# Patient Record
Sex: FEMALE | Race: BLACK OR AFRICAN AMERICAN | Employment: FULL TIME | URBAN - METROPOLITAN AREA
[De-identification: names, ages, dates, MRNs, and addresses within clinical notes are randomized per-mention and may not be internally consistent; named-entity substitution may affect disease eponyms.]

---

## 2017-01-20 RX ORDER — ERGOCALCIFEROL 1.25 MG/1
CAPSULE ORAL
Qty: 6 CAPSULE | Refills: 0 | OUTPATIENT
Start: 2017-01-20

## 2017-01-20 NOTE — TELEPHONE ENCOUNTER
Reviewed Vitamin D refill request with pt. Pt wasn't sure if she was to take this med after she had her baby. Med was last prescribed 9/2/15.     Please advise

## 2017-01-24 ENCOUNTER — OFFICE VISIT (OUTPATIENT)
Dept: FAMILY MEDICINE CLINIC | Facility: CLINIC | Age: 27
End: 2017-01-24

## 2017-01-24 ENCOUNTER — APPOINTMENT (OUTPATIENT)
Dept: LAB | Age: 27
End: 2017-01-24
Attending: FAMILY MEDICINE
Payer: COMMERCIAL

## 2017-01-24 VITALS
SYSTOLIC BLOOD PRESSURE: 128 MMHG | BODY MASS INDEX: 40 KG/M2 | WEIGHT: 220 LBS | DIASTOLIC BLOOD PRESSURE: 84 MMHG | HEART RATE: 69 BPM

## 2017-01-24 DIAGNOSIS — E55.9 VITAMIN D DEFICIENCY: ICD-10-CM

## 2017-01-24 DIAGNOSIS — E55.9 VITAMIN D DEFICIENCY: Primary | ICD-10-CM

## 2017-01-24 PROCEDURE — 36415 COLL VENOUS BLD VENIPUNCTURE: CPT

## 2017-01-24 PROCEDURE — 99212 OFFICE O/P EST SF 10 MIN: CPT | Performed by: FAMILY MEDICINE

## 2017-01-24 PROCEDURE — 99213 OFFICE O/P EST LOW 20 MIN: CPT | Performed by: FAMILY MEDICINE

## 2017-01-24 PROCEDURE — 82306 VITAMIN D 25 HYDROXY: CPT

## 2017-01-24 RX ORDER — NORETHINDRONE ACETATE AND ETHINYL ESTRADIOL AND FERROUS FUMARATE 1MG-20(21)
KIT ORAL
COMMUNITY
Start: 2017-01-19 | End: 2018-02-12 | Stop reason: ALTCHOICE

## 2017-01-24 NOTE — PROGRESS NOTES
HPI:    Nils Pelayo is a 23year old female presents to clinic for follow up regarding vit D deficiency. About one year back she was told that this was low, started taking weekly supplements but stopped when she found out she was pregnant.  Would like thi d deficiency  (primary encounter diagnosis)  - Vit D to lab  - will follow up results and treat accordingly          Orders This Visit:    Orders Placed This Encounter  Vitamin D, 25-Hydroxy [E]    Meds This Visit:    No prescriptions requested or ordered

## 2017-01-25 LAB — 25(OH)D3 SERPL-MCNC: 14.9 NG/ML

## 2017-12-19 ENCOUNTER — LAB ENCOUNTER (OUTPATIENT)
Dept: LAB | Age: 27
End: 2017-12-19
Attending: FAMILY MEDICINE
Payer: COMMERCIAL

## 2017-12-19 ENCOUNTER — OFFICE VISIT (OUTPATIENT)
Dept: FAMILY MEDICINE CLINIC | Facility: CLINIC | Age: 27
End: 2017-12-19

## 2017-12-19 VITALS
TEMPERATURE: 98 F | HEIGHT: 62 IN | BODY MASS INDEX: 40.48 KG/M2 | HEART RATE: 79 BPM | RESPIRATION RATE: 17 BRPM | WEIGHT: 220 LBS | SYSTOLIC BLOOD PRESSURE: 115 MMHG | DIASTOLIC BLOOD PRESSURE: 80 MMHG

## 2017-12-19 DIAGNOSIS — Z00.00 ANNUAL PHYSICAL EXAM: ICD-10-CM

## 2017-12-19 DIAGNOSIS — Z00.00 ANNUAL PHYSICAL EXAM: Primary | ICD-10-CM

## 2017-12-19 PROCEDURE — 87591 N.GONORRHOEAE DNA AMP PROB: CPT

## 2017-12-19 PROCEDURE — 86780 TREPONEMA PALLIDUM: CPT

## 2017-12-19 PROCEDURE — 84443 ASSAY THYROID STIM HORMONE: CPT

## 2017-12-19 PROCEDURE — 87491 CHLMYD TRACH DNA AMP PROBE: CPT

## 2017-12-19 PROCEDURE — 36415 COLL VENOUS BLD VENIPUNCTURE: CPT

## 2017-12-19 PROCEDURE — 99395 PREV VISIT EST AGE 18-39: CPT | Performed by: FAMILY MEDICINE

## 2017-12-19 PROCEDURE — 80053 COMPREHEN METABOLIC PANEL: CPT

## 2017-12-19 PROCEDURE — 85025 COMPLETE CBC W/AUTO DIFF WBC: CPT

## 2017-12-19 PROCEDURE — 87389 HIV-1 AG W/HIV-1&-2 AB AG IA: CPT

## 2017-12-19 PROCEDURE — 80061 LIPID PANEL: CPT

## 2018-01-08 ENCOUNTER — TELEPHONE (OUTPATIENT)
Dept: FAMILY MEDICINE CLINIC | Facility: CLINIC | Age: 28
End: 2018-01-08

## 2018-01-08 NOTE — TELEPHONE ENCOUNTER
Reviewed results with pt as pt had not seen/noticed SK's note on MyChart. Pt voiced understanding and denies further questions/concerns.  Ok thanks        Written by Almita Lei MD on 12/21/2017  5:34 PM   Ruddy Michelle, all of your labs look normal, your ch

## 2018-02-12 ENCOUNTER — OFFICE VISIT (OUTPATIENT)
Dept: FAMILY MEDICINE CLINIC | Facility: CLINIC | Age: 28
End: 2018-02-12

## 2018-02-12 VITALS
DIASTOLIC BLOOD PRESSURE: 84 MMHG | HEART RATE: 85 BPM | HEIGHT: 62 IN | RESPIRATION RATE: 14 BRPM | WEIGHT: 220 LBS | SYSTOLIC BLOOD PRESSURE: 127 MMHG | BODY MASS INDEX: 40.48 KG/M2 | TEMPERATURE: 98 F

## 2018-02-12 DIAGNOSIS — Z30.09 GENERAL COUNSELING AND ADVICE FOR CONTRACEPTIVE MANAGEMENT: Primary | ICD-10-CM

## 2018-02-12 PROCEDURE — 99213 OFFICE O/P EST LOW 20 MIN: CPT | Performed by: FAMILY MEDICINE

## 2018-02-12 PROCEDURE — 99212 OFFICE O/P EST SF 10 MIN: CPT | Performed by: FAMILY MEDICINE

## 2018-02-12 RX ORDER — ETONOGESTREL AND ETHINYL ESTRADIOL 11.7; 2.7 MG/1; MG/1
INSERT, EXTENDED RELEASE VAGINAL
Qty: 1 EACH | Refills: 0 | Status: SHIPPED | OUTPATIENT
Start: 2018-02-12 | End: 2018-04-17

## 2018-02-12 NOTE — PROGRESS NOTES
HPI:    Senia Koenig is a 33year old female presents to clinic for follow up regarding birth control. Would like to have this switched. States that she can't remember to take her pill everyday. Says that she ends up taking in 4-5 times a week.  Is afraid management  (primary encounter diagnosis)  - discussed the NuvaRing, Depo Shot and IUD. She would like to try the [employer], rx to pharmacy. Will axel for refills if satisfied by this method, will return if not and we will consider an IUD.  Safe sexual pract

## 2018-02-15 ENCOUNTER — TELEPHONE (OUTPATIENT)
Dept: OTHER | Age: 28
End: 2018-02-15

## 2018-02-15 NOTE — TELEPHONE ENCOUNTER
Dr.Weiler for Bo[facility] Strong pt was given Etonogestrel-Ethinyl Estradiol 0.12-0.015 MG/24HR Vaginal Ring and is not sure when is she to start it. She will like to know if she is to start using it when she gets her cycle or after her cycle.  She started her per

## 2018-04-17 ENCOUNTER — OFFICE VISIT (OUTPATIENT)
Dept: FAMILY MEDICINE CLINIC | Facility: CLINIC | Age: 28
End: 2018-04-17

## 2018-04-17 VITALS
RESPIRATION RATE: 14 BRPM | WEIGHT: 225 LBS | TEMPERATURE: 98 F | BODY MASS INDEX: 41 KG/M2 | HEART RATE: 85 BPM | SYSTOLIC BLOOD PRESSURE: 129 MMHG | DIASTOLIC BLOOD PRESSURE: 81 MMHG

## 2018-04-17 DIAGNOSIS — Z30.9 ENCOUNTER FOR CONTRACEPTIVE MANAGEMENT, UNSPECIFIED TYPE: Primary | ICD-10-CM

## 2018-04-17 PROCEDURE — 99212 OFFICE O/P EST SF 10 MIN: CPT | Performed by: FAMILY MEDICINE

## 2018-04-17 PROCEDURE — 99213 OFFICE O/P EST LOW 20 MIN: CPT | Performed by: FAMILY MEDICINE

## 2018-04-17 NOTE — PROGRESS NOTES
HPI:    Leena Brice is a 33year old female presents to clinic for contraception management. Notes that Nuvaring gave her intense nausea, so she stopped using it. No new partners. Patient's last menstrual period was 04/14/2018. 3-4/28 day cycles.  She den pill multiple times and has not been able to remember to take it. HCG level drawn, will return tomorrow for Depo shot. Safe sexual practices advised     Patient verbalized understanding of information discussed. No barriers to learning observed.

## 2018-04-18 ENCOUNTER — NURSE ONLY (OUTPATIENT)
Dept: FAMILY MEDICINE CLINIC | Facility: CLINIC | Age: 28
End: 2018-04-18

## 2018-04-18 PROCEDURE — 96372 THER/PROPH/DIAG INJ SC/IM: CPT | Performed by: FAMILY MEDICINE

## 2018-04-18 RX ORDER — MEDROXYPROGESTERONE ACETATE 150 MG/ML
150 INJECTION, SUSPENSION INTRAMUSCULAR
Status: DISCONTINUED | OUTPATIENT
Start: 2018-04-18 | End: 2019-03-12

## 2018-04-18 RX ADMIN — MEDROXYPROGESTERONE ACETATE 150 MG: 150 INJECTION, SUSPENSION INTRAMUSCULAR at 10:49:00

## 2018-04-18 NOTE — PROGRESS NOTES
Patient is here for a Depo-Injection. Patient's pregnancy test was negative. Injection given and calendar provided with dates of the next injection due. Patient verbalized an understanding and made next Depo-Injection appointment.

## 2018-07-17 ENCOUNTER — NURSE ONLY (OUTPATIENT)
Dept: FAMILY MEDICINE CLINIC | Facility: CLINIC | Age: 28
End: 2018-07-17

## 2018-07-17 DIAGNOSIS — Z30.42 ENCOUNTER FOR DEPO-PROVERA CONTRACEPTION: Primary | ICD-10-CM

## 2018-07-17 PROCEDURE — 96372 THER/PROPH/DIAG INJ SC/IM: CPT | Performed by: FAMILY MEDICINE

## 2018-07-17 RX ADMIN — MEDROXYPROGESTERONE ACETATE 150 MG: 150 INJECTION, SUSPENSION INTRAMUSCULAR at 10:27:00

## 2018-07-17 NOTE — PROGRESS NOTES
Pt is here for her Depo shot, within the window. Tolerated injection well. Pt was advised to schedule her next Depo between October 2 - 16.

## 2018-09-05 ENCOUNTER — TELEPHONE (OUTPATIENT)
Dept: OTHER | Age: 28
End: 2018-09-05

## 2018-09-05 NOTE — TELEPHONE ENCOUNTER
Action Requested: Summary for Provider     []  Critical Lab, Recommendations Needed  [] Need Additional Advice  []   FYI    []   Need Orders  [] Need Medications Sent to Pharmacy  []  Other     SUMMARY: Pt seeking recommendations for vaginal spotting.     P

## 2018-09-05 NOTE — TELEPHONE ENCOUNTER
Spoke with patient and relayed SK message below--patient verbalizes understanding and agreement. No further questions/concerns at this time.

## 2018-09-10 NOTE — TELEPHONE ENCOUNTER
Patient states she is still having symptoms discussed on 9/5/18. States the symptoms went away and have come back. She states this morning sharp abdominal cramping pain today same as previously but also now with nausea, denies vomiting.  Patient with blood

## 2018-09-12 ENCOUNTER — OFFICE VISIT (OUTPATIENT)
Dept: FAMILY MEDICINE CLINIC | Facility: CLINIC | Age: 28
End: 2018-09-12
Payer: COMMERCIAL

## 2018-09-12 VITALS
DIASTOLIC BLOOD PRESSURE: 80 MMHG | WEIGHT: 235 LBS | HEIGHT: 62 IN | BODY MASS INDEX: 43.24 KG/M2 | TEMPERATURE: 98 F | HEART RATE: 80 BPM | SYSTOLIC BLOOD PRESSURE: 123 MMHG

## 2018-09-12 DIAGNOSIS — N92.0 MENSTRUAL SPOTTING: Primary | ICD-10-CM

## 2018-09-12 LAB
CONTROL LINE PRESENT WITH A CLEAR BACKGROUND (YES/NO): YES YES/NO
KIT LOT #: NORMAL NUMERIC
PREGNANCY TEST, URINE: NEGATIVE

## 2018-09-12 PROCEDURE — 99212 OFFICE O/P EST SF 10 MIN: CPT | Performed by: FAMILY MEDICINE

## 2018-09-12 PROCEDURE — 99213 OFFICE O/P EST LOW 20 MIN: CPT | Performed by: FAMILY MEDICINE

## 2018-09-12 PROCEDURE — 81025 URINE PREGNANCY TEST: CPT | Performed by: FAMILY MEDICINE

## 2018-09-13 NOTE — PROGRESS NOTES
HPI:    Jazz Hernandez is a 33year old female presents to clinic for follow up. Notes that after getting depo shot, she did not have a cycle for about 4 months. Last week, she developed some cramping with dark spotting for 4-5 days.  Notes that symptoms hav these symptoms are normal side effects with the depo shot. Says she is fine not getting her cycles regularly if that is a normal side effect. Will return in November for next shot or sooner PRN    Patient verbalized understanding of information discussed.

## 2018-10-16 ENCOUNTER — NURSE ONLY (OUTPATIENT)
Dept: FAMILY MEDICINE CLINIC | Facility: CLINIC | Age: 28
End: 2018-10-16
Payer: COMMERCIAL

## 2018-10-16 DIAGNOSIS — Z30.42 ENCOUNTER FOR DEPO-PROVERA CONTRACEPTION: Primary | ICD-10-CM

## 2018-10-16 PROCEDURE — 96372 THER/PROPH/DIAG INJ SC/IM: CPT | Performed by: FAMILY MEDICINE

## 2018-10-16 RX ADMIN — MEDROXYPROGESTERONE ACETATE 150 MG: 150 INJECTION, SUSPENSION INTRAMUSCULAR at 10:41:00

## 2018-10-16 NOTE — PROCEDURES
Pt is here for Depo injection, within the window. Tolerated well, and next injection due between Jan 1 - 15. Calendar given.

## 2019-01-19 ENCOUNTER — TELEPHONE (OUTPATIENT)
Dept: FAMILY MEDICINE CLINIC | Facility: CLINIC | Age: 29
End: 2019-01-19

## 2019-01-19 DIAGNOSIS — Z30.9 ENCOUNTER FOR CONTRACEPTIVE MANAGEMENT, UNSPECIFIED TYPE: Primary | ICD-10-CM

## 2019-01-22 ENCOUNTER — APPOINTMENT (OUTPATIENT)
Dept: LAB | Age: 29
End: 2019-01-22
Attending: FAMILY MEDICINE
Payer: COMMERCIAL

## 2019-01-22 DIAGNOSIS — Z30.9 ENCOUNTER FOR CONTRACEPTIVE MANAGEMENT, UNSPECIFIED TYPE: ICD-10-CM

## 2019-01-22 LAB — HCG SERPL QL: NEGATIVE

## 2019-01-22 PROCEDURE — 36415 COLL VENOUS BLD VENIPUNCTURE: CPT

## 2019-01-22 PROCEDURE — 84703 CHORIONIC GONADOTROPIN ASSAY: CPT

## 2019-01-22 NOTE — TELEPHONE ENCOUNTER
Called pt and informed her she needs to come in for labs due to being late on depo injection. Pt v/u and states she can come in today for labs. Will route to provider to have labs enter.

## 2019-01-26 ENCOUNTER — PATIENT MESSAGE (OUTPATIENT)
Dept: FAMILY MEDICINE CLINIC | Facility: CLINIC | Age: 29
End: 2019-01-26

## 2019-01-26 NOTE — TELEPHONE ENCOUNTER
From: Shaniqua Kapadia  To: Neo Martinez MD  Sent: 1/26/2019 10:13 AM CST  Subject: Test Results Question    I have a question about HCG, BETA SUBUNIT, QUAL resulted on 1/22/19, 9:38 PM.    Camilo Cleone so do I need to take another blood test or something or everythi

## 2019-01-26 NOTE — PROGRESS NOTES
Dr. Nyla Trejo, Pt's pregnancy tests were negative. [id number] [address]al for Pt to schedule nurse visit for Depo injection? Please advise.        Tha Ibarra, CMA       11:19 AM   Note      Called pt and informed her she needs to come in for labs due to being late on dep

## 2019-01-28 ENCOUNTER — TELEPHONE (OUTPATIENT)
Dept: FAMILY MEDICINE CLINIC | Facility: CLINIC | Age: 29
End: 2019-01-28

## 2019-01-28 NOTE — PROGRESS NOTES
Can we please educate patient on the Depo policy. Needs to have injection within 24 hours of result. If she didn't come back after last time, everything needs to be repeated. Thanks.

## 2019-01-28 NOTE — TELEPHONE ENCOUNTER
Patient called regarding earlier conversation with RN. Patient informed us that she has had sexual relations since 1/22/19. Patient also wanted to discuss other birth control options and wanted to discuss options that don't cause as much weight gain.  Appoi

## 2019-01-28 NOTE — TELEPHONE ENCOUNTER
Siobhan Singh MD routed conversation to [facility] Rn Triage 1 hour ago (11:01 AM)      Siobhan Singh MD 1 hour ago (11:01 AM)        Can we please educate patient on the Depo policy. Needs to have injection within 24 hours of result.  If she didn't come ba

## 2019-01-28 NOTE — TELEPHONE ENCOUNTER
Pt returning RN call re Depo injection. Stated was not informed of negative HCG lab until after 24 hours. Informed pt of need to have test again due to the length of time. Pt stated she'd call back and make apt with SK for 1/29.

## 2019-01-29 ENCOUNTER — OFFICE VISIT (OUTPATIENT)
Dept: FAMILY MEDICINE CLINIC | Facility: CLINIC | Age: 29
End: 2019-01-29
Payer: COMMERCIAL

## 2019-01-29 VITALS
HEIGHT: 62 IN | WEIGHT: 233.63 LBS | TEMPERATURE: 97 F | HEART RATE: 79 BPM | DIASTOLIC BLOOD PRESSURE: 83 MMHG | BODY MASS INDEX: 42.99 KG/M2 | SYSTOLIC BLOOD PRESSURE: 125 MMHG

## 2019-01-29 DIAGNOSIS — Z30.40 ENCOUNTER FOR SURVEILLANCE OF CONTRACEPTIVES, UNSPECIFIED CONTRACEPTIVE: Primary | ICD-10-CM

## 2019-01-29 PROCEDURE — 99213 OFFICE O/P EST LOW 20 MIN: CPT | Performed by: FAMILY MEDICINE

## 2019-01-29 PROCEDURE — 99212 OFFICE O/P EST SF 10 MIN: CPT | Performed by: FAMILY MEDICINE

## 2019-01-29 RX ORDER — NORETHINDRONE ACETATE AND ETHINYL ESTRADIOL 1.5-30(21)
1 KIT ORAL DAILY
Qty: 3 PACKAGE | Refills: 1 | Status: SHIPPED | OUTPATIENT
Start: 2019-01-29 | End: 2019-07-07

## 2019-01-31 NOTE — PROGRESS NOTES
HPI:    Angelica Parikh is a 33year old female presents to clinic to discuss birth control options. Notes that she was previously on the Depakote shot, but feels she gained a lot of weight with this.   Is sexually active with one partner, using condoms abou surveillance of contraceptives, unspecified contraceptive  (primary encounter diagnosis)  Plan:   -Contraceptive methods discussed with patient, would like to try the pill again. Rx to pharmacy.   I did advise her to start exercising and to have a balanced

## 2019-03-12 ENCOUNTER — OFFICE VISIT (OUTPATIENT)
Dept: FAMILY MEDICINE CLINIC | Facility: CLINIC | Age: 29
End: 2019-03-12
Payer: COMMERCIAL

## 2019-03-12 ENCOUNTER — LAB ENCOUNTER (OUTPATIENT)
Dept: LAB | Age: 29
End: 2019-03-12
Attending: FAMILY MEDICINE
Payer: COMMERCIAL

## 2019-03-12 VITALS
SYSTOLIC BLOOD PRESSURE: 110 MMHG | BODY MASS INDEX: 42.76 KG/M2 | HEART RATE: 90 BPM | HEIGHT: 62 IN | TEMPERATURE: 98 F | DIASTOLIC BLOOD PRESSURE: 77 MMHG | WEIGHT: 232.38 LBS

## 2019-03-12 DIAGNOSIS — Z01.419 WELL WOMAN EXAM WITH ROUTINE GYNECOLOGICAL EXAM: Primary | ICD-10-CM

## 2019-03-12 DIAGNOSIS — Z01.419 WELL WOMAN EXAM WITH ROUTINE GYNECOLOGICAL EXAM: ICD-10-CM

## 2019-03-12 LAB
ALBUMIN SERPL-MCNC: 3.4 G/DL (ref 3.4–5)
ALBUMIN/GLOB SERPL: 0.9 {RATIO} (ref 1–2)
ALP LIVER SERPL-CCNC: 34 U/L (ref 37–98)
ALT SERPL-CCNC: 16 U/L (ref 13–56)
ANION GAP SERPL CALC-SCNC: 6 MMOL/L (ref 0–18)
AST SERPL-CCNC: 16 U/L (ref 15–37)
BASOPHILS # BLD AUTO: 0.01 X10(3) UL (ref 0–0.2)
BASOPHILS NFR BLD AUTO: 0.2 %
BILIRUB SERPL-MCNC: 0.4 MG/DL (ref 0.1–2)
BUN BLD-MCNC: 9 MG/DL (ref 7–18)
BUN/CREAT SERPL: 12.3 (ref 10–20)
CALCIUM BLD-MCNC: 8.1 MG/DL (ref 8.5–10.1)
CHLORIDE SERPL-SCNC: 109 MMOL/L (ref 98–107)
CHOLEST SMN-MCNC: 98 MG/DL (ref ?–200)
CO2 SERPL-SCNC: 27 MMOL/L (ref 21–32)
CREAT BLD-MCNC: 0.73 MG/DL (ref 0.55–1.02)
DEPRECATED RDW RBC AUTO: 39.9 FL (ref 35.1–46.3)
EOSINOPHIL # BLD AUTO: 0.02 X10(3) UL (ref 0–0.7)
EOSINOPHIL NFR BLD AUTO: 0.3 %
ERYTHROCYTE [DISTWIDTH] IN BLOOD BY AUTOMATED COUNT: 12.3 % (ref 11–15)
GLOBULIN PLAS-MCNC: 3.7 G/DL (ref 2.8–4.4)
GLUCOSE BLD-MCNC: 74 MG/DL (ref 70–99)
HCT VFR BLD AUTO: 37 % (ref 35–48)
HDLC SERPL-MCNC: 40 MG/DL (ref 40–59)
HGB BLD-MCNC: 11.9 G/DL (ref 12–16)
IMM GRANULOCYTES # BLD AUTO: 0.01 X10(3) UL (ref 0–1)
IMM GRANULOCYTES NFR BLD: 0.2 %
LDLC SERPL CALC-MCNC: 44 MG/DL (ref ?–100)
LYMPHOCYTES # BLD AUTO: 2.12 X10(3) UL (ref 1–4)
LYMPHOCYTES NFR BLD AUTO: 36.4 %
M PROTEIN MFR SERPL ELPH: 7.1 G/DL (ref 6.4–8.2)
MCH RBC QN AUTO: 28.8 PG (ref 26–34)
MCHC RBC AUTO-ENTMCNC: 32.2 G/DL (ref 31–37)
MCV RBC AUTO: 89.6 FL (ref 80–100)
MONOCYTES # BLD AUTO: 0.26 X10(3) UL (ref 0.1–1)
MONOCYTES NFR BLD AUTO: 4.5 %
NEUTROPHILS # BLD AUTO: 3.41 X10 (3) UL (ref 1.5–7.7)
NEUTROPHILS # BLD AUTO: 3.41 X10(3) UL (ref 1.5–7.7)
NEUTROPHILS NFR BLD AUTO: 58.4 %
NONHDLC SERPL-MCNC: 58 MG/DL (ref ?–130)
OSMOLALITY SERPL CALC.SUM OF ELEC: 291 MOSM/KG (ref 275–295)
PLATELET # BLD AUTO: 326 10(3)UL (ref 150–450)
POTASSIUM SERPL-SCNC: 3.4 MMOL/L (ref 3.5–5.1)
RBC # BLD AUTO: 4.13 X10(6)UL (ref 3.8–5.3)
SODIUM SERPL-SCNC: 142 MMOL/L (ref 136–145)
TRIGL SERPL-MCNC: 71 MG/DL (ref 30–149)
TSI SER-ACNC: 0.77 MIU/ML (ref 0.36–3.74)
VLDLC SERPL CALC-MCNC: 14 MG/DL (ref 0–30)
WBC # BLD AUTO: 5.8 X10(3) UL (ref 4–11)

## 2019-03-12 PROCEDURE — 86780 TREPONEMA PALLIDUM: CPT

## 2019-03-12 PROCEDURE — 84443 ASSAY THYROID STIM HORMONE: CPT

## 2019-03-12 PROCEDURE — 85025 COMPLETE CBC W/AUTO DIFF WBC: CPT

## 2019-03-12 PROCEDURE — 80053 COMPREHEN METABOLIC PANEL: CPT

## 2019-03-12 PROCEDURE — 36415 COLL VENOUS BLD VENIPUNCTURE: CPT

## 2019-03-12 PROCEDURE — 99395 PREV VISIT EST AGE 18-39: CPT | Performed by: FAMILY MEDICINE

## 2019-03-12 PROCEDURE — 87389 HIV-1 AG W/HIV-1&-2 AB AG IA: CPT

## 2019-03-12 PROCEDURE — 80061 LIPID PANEL: CPT

## 2019-03-12 NOTE — PROGRESS NOTES
HPI:    Ana Gomez is a 33year old female presents to clinic for a well woman exam.  No acute concerns or major changes. Normal appetite, balanced diet. Normal bowel movements and urination. Normal sleep habits.   Sexually active with one partner, no normal.   Mouth/Throat: Uvula is midline, oropharynx is clear and moist and mucous membranes are normal.   Eyes: Conjunctivae and EOM are normal. Pupils are equal, round, and reactive to light. Neck: Normal range of motion. Neck supple.  No thyromegaly pr This Visit:  Orders Placed This Encounter      CBC W Differential W Platelet [E]      Comp Metabolic Panel (14) [E]      TSH W Reflex To Free T4 [E]      Lipid Panel [E]      HIV AG AB Combo [E]      T Pallidum Screening [place] TREP [E]      ThinPrep PAP

## 2019-03-13 LAB
C TRACH DNA SPEC QL NAA+PROBE: NEGATIVE
N GONORRHOEA DNA SPEC QL NAA+PROBE: NEGATIVE
T PALLIDUM AB SER QL: NEGATIVE

## 2019-03-14 LAB
HPV I/H RISK 1 DNA SPEC QL NAA+PROBE: NEGATIVE
LAST PAP RESULT: NORMAL

## 2019-07-08 RX ORDER — NORETHINDRONE ACETATE AND ETHINYL ESTRADIOL 1.5-30(21)
KIT ORAL
Qty: 84 TABLET | Refills: 1 | Status: SHIPPED | OUTPATIENT
Start: 2019-07-08 | End: 2019-09-27

## 2019-07-08 NOTE — TELEPHONE ENCOUNTER
Gynecology Medications  Protocol Criteria:  · Appointment scheduled in the past 12 months or the next 3 months  · Pap smear in the past 12 months  · Pap smear WNL manually verified  Recent Outpatient Visits            3 months ago Well woman exam with rout

## 2019-08-08 ENCOUNTER — APPOINTMENT (OUTPATIENT)
Dept: LAB | Age: 29
End: 2019-08-08
Attending: FAMILY MEDICINE
Payer: COMMERCIAL

## 2019-08-08 ENCOUNTER — NURSE TRIAGE (OUTPATIENT)
Dept: OTHER | Age: 29
End: 2019-08-08

## 2019-08-08 ENCOUNTER — OFFICE VISIT (OUTPATIENT)
Dept: FAMILY MEDICINE CLINIC | Facility: CLINIC | Age: 29
End: 2019-08-08
Payer: COMMERCIAL

## 2019-08-08 VITALS
TEMPERATURE: 97 F | BODY MASS INDEX: 42.88 KG/M2 | WEIGHT: 233 LBS | DIASTOLIC BLOOD PRESSURE: 86 MMHG | HEIGHT: 62 IN | SYSTOLIC BLOOD PRESSURE: 128 MMHG | HEART RATE: 82 BPM | RESPIRATION RATE: 20 BRPM

## 2019-08-08 DIAGNOSIS — R42 DIZZINESS: ICD-10-CM

## 2019-08-08 DIAGNOSIS — R51.9 HEADACHE DISORDER: Primary | ICD-10-CM

## 2019-08-08 LAB
ALBUMIN SERPL-MCNC: 3.4 G/DL (ref 3.4–5)
ALBUMIN/GLOB SERPL: 0.9 {RATIO} (ref 1–2)
ALP LIVER SERPL-CCNC: 42 U/L (ref 37–98)
ALT SERPL-CCNC: 15 U/L (ref 13–56)
ANION GAP SERPL CALC-SCNC: 8 MMOL/L (ref 0–18)
AST SERPL-CCNC: 13 U/L (ref 15–37)
BILIRUB SERPL-MCNC: 0.4 MG/DL (ref 0.1–2)
BUN BLD-MCNC: 10 MG/DL (ref 7–18)
BUN/CREAT SERPL: 11.8 (ref 10–20)
CALCIUM BLD-MCNC: 8.6 MG/DL (ref 8.5–10.1)
CHLORIDE SERPL-SCNC: 106 MMOL/L (ref 98–112)
CO2 SERPL-SCNC: 27 MMOL/L (ref 21–32)
CONTROL LINE PRESENT WITH A CLEAR BACKGROUND (YES/NO): YES YES/NO
CREAT BLD-MCNC: 0.85 MG/DL (ref 0.55–1.02)
DEPRECATED RDW RBC AUTO: 38.2 FL (ref 35.1–46.3)
ERYTHROCYTE [DISTWIDTH] IN BLOOD BY AUTOMATED COUNT: 11.9 % (ref 11–15)
EST. AVERAGE GLUCOSE BLD GHB EST-MCNC: 100 MG/DL (ref 68–126)
GLOBULIN PLAS-MCNC: 3.9 G/DL (ref 2.8–4.4)
GLUCOSE BLD-MCNC: 84 MG/DL (ref 70–99)
HBA1C MFR BLD HPLC: 5.1 % (ref ?–5.7)
HCT VFR BLD AUTO: 38.4 % (ref 35–48)
HGB BLD-MCNC: 12.7 G/DL (ref 12–16)
KIT LOT #: NORMAL NUMERIC
M PROTEIN MFR SERPL ELPH: 7.3 G/DL (ref 6.4–8.2)
MCH RBC QN AUTO: 28.9 PG (ref 26–34)
MCHC RBC AUTO-ENTMCNC: 33.1 G/DL (ref 31–37)
MCV RBC AUTO: 87.5 FL (ref 80–100)
OSMOLALITY SERPL CALC.SUM OF ELEC: 290 MOSM/KG (ref 275–295)
PATIENT FASTING: YES
PLATELET # BLD AUTO: 328 10(3)UL (ref 150–450)
POTASSIUM SERPL-SCNC: 3.4 MMOL/L (ref 3.5–5.1)
PREGNANCY TEST, URINE: NEGATIVE
RBC # BLD AUTO: 4.39 X10(6)UL (ref 3.8–5.3)
SODIUM SERPL-SCNC: 141 MMOL/L (ref 136–145)
TSI SER-ACNC: 0.86 MIU/ML (ref 0.36–3.74)
WBC # BLD AUTO: 5.5 X10(3) UL (ref 4–11)

## 2019-08-08 PROCEDURE — 80053 COMPREHEN METABOLIC PANEL: CPT

## 2019-08-08 PROCEDURE — 85027 COMPLETE CBC AUTOMATED: CPT

## 2019-08-08 PROCEDURE — 81025 URINE PREGNANCY TEST: CPT | Performed by: FAMILY MEDICINE

## 2019-08-08 PROCEDURE — 99214 OFFICE O/P EST MOD 30 MIN: CPT | Performed by: FAMILY MEDICINE

## 2019-08-08 PROCEDURE — 84443 ASSAY THYROID STIM HORMONE: CPT

## 2019-08-08 PROCEDURE — 83036 HEMOGLOBIN GLYCOSYLATED A1C: CPT

## 2019-08-08 PROCEDURE — 36415 COLL VENOUS BLD VENIPUNCTURE: CPT

## 2019-08-08 NOTE — TELEPHONE ENCOUNTER
Action Requested: Summary for Provider     []  Critical Lab, Recommendations Needed  [] Need Additional Advice  []   FYI    []   Need Orders  [] Need Medications Sent to Pharmacy  []  Other     SUMMARY: pt states she has been having episodes of dizziness,

## 2019-08-08 NOTE — PROGRESS NOTES
HPI: Radha Andrade is a 30year old female who presents for bad headaches and dizziness. Started 3 days ago while she was taking shower. Felt like room was spinning. She came out and ate. Went to lay down but room was spinning again. [place] asleep for the night. New symptoms. Pt has not had headaches in the past.  Normal exam.  Will get baseline labs as well as MRI/MRA of the head and neck. Will call with results. Advised to proceed to ER if headache worsens or new symptoms develop. Pt verbalized understanding.

## 2019-08-15 ENCOUNTER — HOSPITAL ENCOUNTER (OUTPATIENT)
Dept: MRI IMAGING | Age: 29
Discharge: HOME | End: 2019-08-15
Attending: FAMILY MEDICINE
Payer: COMMERCIAL

## 2019-08-15 DIAGNOSIS — R42 DIZZINESS: ICD-10-CM

## 2019-08-15 DIAGNOSIS — R51.9 HEADACHE DISORDER: ICD-10-CM

## 2019-08-15 PROCEDURE — A9575 INJ GADOTERATE MEGLUMI 0.1ML: HCPCS | Performed by: FAMILY MEDICINE

## 2019-08-15 PROCEDURE — 70549 MR ANGIOGRAPH NECK W/O&W/DYE: CPT | Performed by: FAMILY MEDICINE

## 2019-08-15 PROCEDURE — 70544 MR ANGIOGRAPHY HEAD W/O DYE: CPT | Performed by: FAMILY MEDICINE

## 2019-08-15 PROCEDURE — 70553 MRI BRAIN STEM W/O & W/DYE: CPT | Performed by: FAMILY MEDICINE

## 2019-08-28 ENCOUNTER — OFFICE VISIT (OUTPATIENT)
Dept: FAMILY MEDICINE CLINIC | Facility: CLINIC | Age: 29
End: 2019-08-28
Payer: COMMERCIAL

## 2019-08-28 VITALS
SYSTOLIC BLOOD PRESSURE: 120 MMHG | WEIGHT: 231 LBS | BODY MASS INDEX: 42.51 KG/M2 | HEART RATE: 81 BPM | TEMPERATURE: 98 F | DIASTOLIC BLOOD PRESSURE: 82 MMHG | HEIGHT: 62 IN

## 2019-08-28 DIAGNOSIS — R42 DIZZINESS: ICD-10-CM

## 2019-08-28 DIAGNOSIS — R55 SYNCOPE, UNSPECIFIED SYNCOPE TYPE: Primary | ICD-10-CM

## 2019-08-28 DIAGNOSIS — R51.9 GENERALIZED HEADACHES: ICD-10-CM

## 2019-08-28 PROCEDURE — 99214 OFFICE O/P EST MOD 30 MIN: CPT | Performed by: FAMILY MEDICINE

## 2019-08-28 PROCEDURE — 93000 ELECTROCARDIOGRAM COMPLETE: CPT | Performed by: FAMILY MEDICINE

## 2019-08-28 NOTE — PROGRESS NOTES
HPI:    Maria Luz Carney is a 30year old female presents to clinic for follow-up. Patient was seen on 8/9 with severe headaches.   Had labs and an MRI of head/MRI of neck-all normal.  Reports that she got headaches were improving when on Monday she was at Pulmonary/Chest: Effort normal and breath sounds normal. No respiratory distress. She has no wheezes. She has no rales. Neurological: She is alert. Psychiatric: She has a normal mood and affect. Vitals reviewed.       ASSESSMENT/PLAN:   (R55) Syncop

## 2019-08-30 ENCOUNTER — TELEPHONE (OUTPATIENT)
Dept: FAMILY MEDICINE CLINIC | Facility: CLINIC | Age: 29
End: 2019-08-30

## 2019-08-30 NOTE — TELEPHONE ENCOUNTER
Pt dropped off FMLA forms, signed HIPAA+FCR, pt to be billed $25 processing fee.  Forms logged for OPO

## 2019-09-04 NOTE — TELEPHONE ENCOUNTER
FMLA forms faxed to [facility] @ [phone], confirm rcvd. Copy mailed to pt.

## 2019-09-26 ENCOUNTER — TELEPHONE (OUTPATIENT)
Dept: FAMILY MEDICINE CLINIC | Facility: CLINIC | Age: 29
End: 2019-09-26

## 2019-09-26 NOTE — TELEPHONE ENCOUNTER
Patient missed doses and had to start new pack so requesting early fill for     BLISOVI FE 1.5/30 1.5-30 MG-MCG Oral Tab TAKE 1 TABLET BY MOUTH DAILY Disp: 84 tablet Rfl: 1     Advised in the future to request from pharmacy first.

## 2019-09-27 RX ORDER — NORETHINDRONE ACETATE AND ETHINYL ESTRADIOL 1.5-30(21)
1 KIT ORAL
Qty: 84 TABLET | Refills: 1 | Status: SHIPPED | OUTPATIENT
Start: 2019-09-27 | End: 2020-06-15

## 2020-03-26 ENCOUNTER — NURSE TRIAGE (OUTPATIENT)
Dept: FAMILY MEDICINE CLINIC | Facility: CLINIC | Age: 30
End: 2020-03-26

## 2020-03-26 NOTE — TELEPHONE ENCOUNTER
Action Requested: Summary for Provider     []  Critical Lab, Recommendations Needed  [] Need Additional Advice  []   FYI    []   Need Orders  [] Need Medications Sent to Pharmacy  []  Other     SUMMARY: Patient calling stating that she has been experiencin

## 2020-04-02 ENCOUNTER — TELEPHONE (OUTPATIENT)
Dept: OTHER | Age: 30
End: 2020-04-02

## 2020-04-02 NOTE — TELEPHONE ENCOUNTER
Patient calling with condition update  Was informed a close contact at work tested positive for Covid 19. Patient does have a cough, no fever at this time, however had fever last week. No shortness of breath. Patient asking if she may be tested?   Has

## 2020-04-03 ENCOUNTER — VIRTUAL PHONE E/M (OUTPATIENT)
Dept: FAMILY MEDICINE CLINIC | Facility: CLINIC | Age: 30
End: 2020-04-03

## 2020-04-03 DIAGNOSIS — Z20.822 EXPOSURE TO 2019 NOVEL CORONAVIRUS: ICD-10-CM

## 2020-04-03 DIAGNOSIS — R05.9 COUGH: Primary | ICD-10-CM

## 2020-04-03 PROCEDURE — 99213 OFFICE O/P EST LOW 20 MIN: CPT | Performed by: FAMILY MEDICINE

## 2020-04-03 NOTE — PROGRESS NOTES
Virtual/Telephone Check-In    [facility] verbally consents to a Virtual/Telephone Check-In service on 04/03/20. Patient understands and accepts financial responsibility for any deductible, co-insurance and/or co-pays associated with this service.     Du

## 2020-06-15 RX ORDER — NORETHINDRONE ACETATE AND ETHINYL ESTRADIOL AND FERROUS FUMARATE 1.5-30(21)
KIT ORAL
Qty: 84 TABLET | Refills: 1 | Status: SHIPPED | OUTPATIENT
Start: 2020-06-15 | End: 2020-11-20

## 2020-08-12 ENCOUNTER — OFFICE VISIT (OUTPATIENT)
Dept: FAMILY MEDICINE CLINIC | Facility: CLINIC | Age: 30
End: 2020-08-12
Payer: COMMERCIAL

## 2020-08-12 ENCOUNTER — LAB ENCOUNTER (OUTPATIENT)
Dept: LAB | Age: 30
End: 2020-08-12
Attending: FAMILY MEDICINE
Payer: COMMERCIAL

## 2020-08-12 VITALS
SYSTOLIC BLOOD PRESSURE: 127 MMHG | WEIGHT: 233.63 LBS | HEIGHT: 61.42 IN | BODY MASS INDEX: 43.54 KG/M2 | HEART RATE: 73 BPM | TEMPERATURE: 97 F | DIASTOLIC BLOOD PRESSURE: 83 MMHG

## 2020-08-12 DIAGNOSIS — Z00.00 ANNUAL PHYSICAL EXAM: Primary | ICD-10-CM

## 2020-08-12 DIAGNOSIS — Z00.00 ANNUAL PHYSICAL EXAM: ICD-10-CM

## 2020-08-12 LAB
ALBUMIN SERPL-MCNC: 3.1 G/DL (ref 3.4–5)
ALBUMIN/GLOB SERPL: 0.8 {RATIO} (ref 1–2)
ALP LIVER SERPL-CCNC: 34 U/L (ref 37–98)
ALT SERPL-CCNC: 17 U/L (ref 13–56)
ANION GAP SERPL CALC-SCNC: 8 MMOL/L (ref 0–18)
AST SERPL-CCNC: 12 U/L (ref 15–37)
BASOPHILS # BLD AUTO: 0.02 X10(3) UL (ref 0–0.2)
BASOPHILS NFR BLD AUTO: 0.3 %
BILIRUB SERPL-MCNC: 0.3 MG/DL (ref 0.1–2)
BUN BLD-MCNC: 15 MG/DL (ref 7–18)
BUN/CREAT SERPL: 18.5 (ref 10–20)
CALCIUM BLD-MCNC: 8.3 MG/DL (ref 8.5–10.1)
CHLORIDE SERPL-SCNC: 108 MMOL/L (ref 98–112)
CHOLEST SMN-MCNC: 99 MG/DL (ref ?–200)
CO2 SERPL-SCNC: 24 MMOL/L (ref 21–32)
CREAT BLD-MCNC: 0.81 MG/DL (ref 0.55–1.02)
DEPRECATED RDW RBC AUTO: 38.7 FL (ref 35.1–46.3)
EOSINOPHIL # BLD AUTO: 0.04 X10(3) UL (ref 0–0.7)
EOSINOPHIL NFR BLD AUTO: 0.5 %
ERYTHROCYTE [DISTWIDTH] IN BLOOD BY AUTOMATED COUNT: 12.1 % (ref 11–15)
EST. AVERAGE GLUCOSE BLD GHB EST-MCNC: 94 MG/DL (ref 68–126)
GLOBULIN PLAS-MCNC: 4.1 G/DL (ref 2.8–4.4)
GLUCOSE BLD-MCNC: 77 MG/DL (ref 70–99)
HBA1C MFR BLD HPLC: 4.9 % (ref ?–5.7)
HCG SERPL QL: NEGATIVE
HCT VFR BLD AUTO: 37 % (ref 35–48)
HDLC SERPL-MCNC: 43 MG/DL (ref 40–59)
HGB BLD-MCNC: 12.6 G/DL (ref 12–16)
IMM GRANULOCYTES # BLD AUTO: 0.02 X10(3) UL (ref 0–1)
IMM GRANULOCYTES NFR BLD: 0.3 %
LDLC SERPL CALC-MCNC: 40 MG/DL (ref ?–100)
LYMPHOCYTES # BLD AUTO: 3.14 X10(3) UL (ref 1–4)
LYMPHOCYTES NFR BLD AUTO: 41.5 %
M PROTEIN MFR SERPL ELPH: 7.2 G/DL (ref 6.4–8.2)
MCH RBC QN AUTO: 29.7 PG (ref 26–34)
MCHC RBC AUTO-ENTMCNC: 34.1 G/DL (ref 31–37)
MCV RBC AUTO: 87.3 FL (ref 80–100)
MONOCYTES # BLD AUTO: 0.41 X10(3) UL (ref 0.1–1)
MONOCYTES NFR BLD AUTO: 5.4 %
NEUTROPHILS # BLD AUTO: 3.93 X10 (3) UL (ref 1.5–7.7)
NEUTROPHILS # BLD AUTO: 3.93 X10(3) UL (ref 1.5–7.7)
NEUTROPHILS NFR BLD AUTO: 52 %
NONHDLC SERPL-MCNC: 56 MG/DL (ref ?–130)
OSMOLALITY SERPL CALC.SUM OF ELEC: 290 MOSM/KG (ref 275–295)
PATIENT FASTING Y/N/NP: NO
PATIENT FASTING Y/N/NP: NO
PLATELET # BLD AUTO: 313 10(3)UL (ref 150–450)
POTASSIUM SERPL-SCNC: 3.6 MMOL/L (ref 3.5–5.1)
RBC # BLD AUTO: 4.24 X10(6)UL (ref 3.8–5.3)
SODIUM SERPL-SCNC: 140 MMOL/L (ref 136–145)
TRIGL SERPL-MCNC: 78 MG/DL (ref 30–149)
TSI SER-ACNC: 0.92 MIU/ML (ref 0.36–3.74)
VLDLC SERPL CALC-MCNC: 16 MG/DL (ref 0–30)
WBC # BLD AUTO: 7.6 X10(3) UL (ref 4–11)

## 2020-08-12 PROCEDURE — 84703 CHORIONIC GONADOTROPIN ASSAY: CPT

## 2020-08-12 PROCEDURE — 84443 ASSAY THYROID STIM HORMONE: CPT

## 2020-08-12 PROCEDURE — 87591 N.GONORRHOEAE DNA AMP PROB: CPT

## 2020-08-12 PROCEDURE — 87389 HIV-1 AG W/HIV-1&-2 AB AG IA: CPT

## 2020-08-12 PROCEDURE — 85025 COMPLETE CBC W/AUTO DIFF WBC: CPT

## 2020-08-12 PROCEDURE — 80061 LIPID PANEL: CPT

## 2020-08-12 PROCEDURE — 83036 HEMOGLOBIN GLYCOSYLATED A1C: CPT

## 2020-08-12 PROCEDURE — 3079F DIAST BP 80-89 MM HG: CPT | Performed by: FAMILY MEDICINE

## 2020-08-12 PROCEDURE — 87491 CHLMYD TRACH DNA AMP PROBE: CPT

## 2020-08-12 PROCEDURE — 3074F SYST BP LT 130 MM HG: CPT | Performed by: FAMILY MEDICINE

## 2020-08-12 PROCEDURE — 3008F BODY MASS INDEX DOCD: CPT | Performed by: FAMILY MEDICINE

## 2020-08-12 PROCEDURE — 99395 PREV VISIT EST AGE 18-39: CPT | Performed by: FAMILY MEDICINE

## 2020-08-12 PROCEDURE — 86780 TREPONEMA PALLIDUM: CPT

## 2020-08-12 PROCEDURE — 80053 COMPREHEN METABOLIC PANEL: CPT

## 2020-08-12 PROCEDURE — 96127 BRIEF EMOTIONAL/BEHAV ASSMT: CPT | Performed by: FAMILY MEDICINE

## 2020-08-12 PROCEDURE — 36415 COLL VENOUS BLD VENIPUNCTURE: CPT

## 2020-08-12 NOTE — PROGRESS NOTES
HPI:    Yusef Graham is a 35year old female presents to clinic for an annual physical exam.  No acute concerns. Normal appetite, tries to eat healthy foods. Normal bowel movements and urination. Normal sleep habits.   Patient is currently not exercis and mucous membranes are normal.   Eyes: Pupils are equal, round, and reactive to light. Conjunctivae and EOM are normal.   Neck: Normal range of motion. Neck supple. No thyromegaly present.    Cardiovascular: Normal rate, regular rhythm and normal heart so may still exist. Please contact me with any questions.        8/12/2020  Jill Bolton MD

## 2020-08-13 LAB
C TRACH DNA SPEC QL NAA+PROBE: NEGATIVE
N GONORRHOEA DNA SPEC QL NAA+PROBE: NEGATIVE

## 2020-08-14 LAB — T PALLIDUM AB SER QL: NEGATIVE

## 2020-08-26 ENCOUNTER — OFFICE VISIT (OUTPATIENT)
Dept: FAMILY MEDICINE CLINIC | Facility: CLINIC | Age: 30
End: 2020-08-26
Payer: COMMERCIAL

## 2020-08-26 VITALS
TEMPERATURE: 98 F | DIASTOLIC BLOOD PRESSURE: 81 MMHG | HEART RATE: 70 BPM | WEIGHT: 237.25 LBS | BODY MASS INDEX: 44.22 KG/M2 | SYSTOLIC BLOOD PRESSURE: 129 MMHG | HEIGHT: 61.5 IN

## 2020-08-26 DIAGNOSIS — N62 LARGE BREASTS: Primary | ICD-10-CM

## 2020-08-26 DIAGNOSIS — E66.01 CLASS 3 SEVERE OBESITY WITHOUT SERIOUS COMORBIDITY WITH BODY MASS INDEX (BMI) OF 40.0 TO 44.9 IN ADULT, UNSPECIFIED OBESITY TYPE (HCC): ICD-10-CM

## 2020-08-26 PROCEDURE — 3079F DIAST BP 80-89 MM HG: CPT | Performed by: FAMILY MEDICINE

## 2020-08-26 PROCEDURE — 3074F SYST BP LT 130 MM HG: CPT | Performed by: FAMILY MEDICINE

## 2020-08-26 PROCEDURE — 99214 OFFICE O/P EST MOD 30 MIN: CPT | Performed by: FAMILY MEDICINE

## 2020-08-26 PROCEDURE — 3008F BODY MASS INDEX DOCD: CPT | Performed by: FAMILY MEDICINE

## 2020-08-26 NOTE — PROGRESS NOTES
HPI:    Chaz Ramires is a 35year old female presents to clinic with questions regarding plastic surgery. Patient is desiring a breast reduction, liposuction, and an abdominal plasty.   Reports that after she delivered her baby about 4 years back, her b (primary encounter diagnosis)  (E66.01,  Z68.41) Class 3 severe obesity without serious comorbidity with body mass index (BMI) of 40.0 to 44.9 in adult, unspecified obesity type ([facility]  Plan:   -I did encourage patient to potentially plan her next pregnancy

## 2020-09-09 ENCOUNTER — TELEPHONE (OUTPATIENT)
Dept: FAMILY MEDICINE CLINIC | Facility: CLINIC | Age: 30
End: 2020-09-09

## 2020-09-09 NOTE — TELEPHONE ENCOUNTER
Spoke with patient, (  verified ) states has decided to \" go ahead 'with plastic surgery as previously discussed with Dr. Robles Basilio    Requesting name and referral for plastic surgeon     Please advise and thank you.

## 2020-09-15 ENCOUNTER — PATIENT MESSAGE (OUTPATIENT)
Dept: FAMILY MEDICINE CLINIC | Facility: CLINIC | Age: 30
End: 2020-09-15

## 2020-09-15 NOTE — TELEPHONE ENCOUNTER
From: Darek Varela MD  To: Jessie Stevens  Sent: 9/15/2020 12:03 PM CDT  Subject: Surgeons    Hi Naida Capone,     Here is the number for the surgeons we were discussing:    Dr. Lin Gann   [phone]    Dr. Eleno David   [phone]    - Dr. Maliha Mathis

## 2020-09-21 ENCOUNTER — PATIENT MESSAGE (OUTPATIENT)
Dept: FAMILY MEDICINE CLINIC | Facility: CLINIC | Age: 30
End: 2020-09-21

## 2020-09-21 ENCOUNTER — TELEPHONE (OUTPATIENT)
Dept: FAMILY MEDICINE CLINIC | Facility: CLINIC | Age: 30
End: 2020-09-21

## 2020-09-21 NOTE — TELEPHONE ENCOUNTER
From: Kimberly Cancino  To: Janet Alanis MD  Sent: 9/21/2020 3:15 PM CDT  Subject: Other    Rudine Sis can you give me a call when you get a chance.  Thanks

## 2020-09-21 NOTE — TELEPHONE ENCOUNTER
Patient would like a note/letter stating that she has been by Alie Caal for her back problems. Pt states that she needs this to give the surgeon. Pt states that she has an appt with the surgeon on 11-6-20.  Please, call pt when the note/letter is ready fo

## 2020-09-22 NOTE — TELEPHONE ENCOUNTER
Patient wanted to get a note from you stating that she's been evaluated for back pain and possible breast reduction. Per patient Dr. Rylan Sahu requested said note as pt is going to go to her for breast reduction surgery.  Patient didn't want to disc

## 2020-10-06 ENCOUNTER — TELEPHONE (OUTPATIENT)
Dept: FAMILY MEDICINE CLINIC | Facility: CLINIC | Age: 30
End: 2020-10-06

## 2020-10-06 NOTE — TELEPHONE ENCOUNTER
Patient stated she will be having a pre-op appointment with a different physician before her surgery on 11/06/20. Patient stated they are requesting her PCP to place pre-op labs. Please advise.

## 2020-10-06 NOTE — TELEPHONE ENCOUNTER
Patient is having breast surgery with Dr Harpal Key  on 11/6/20, she is asking us to contact the surgeon's office and find out what labs to do and if she needs presurgical clearance appt with Dr Ramona Young

## 2020-10-17 NOTE — TELEPHONE ENCOUNTER
Spoke with Christian Graves and notified her she needs a preop within 30 days of surgery, Appointment scheduled for 10/21 with Dr. Isis Banda.  Pt will bring copy of lab order from surgeons office

## 2020-10-28 ENCOUNTER — OFFICE VISIT (OUTPATIENT)
Dept: FAMILY MEDICINE CLINIC | Facility: CLINIC | Age: 30
End: 2020-10-28
Payer: COMMERCIAL

## 2020-10-28 ENCOUNTER — LAB ENCOUNTER (OUTPATIENT)
Dept: LAB | Age: 30
End: 2020-10-28
Attending: FAMILY MEDICINE
Payer: COMMERCIAL

## 2020-10-28 VITALS
TEMPERATURE: 97 F | SYSTOLIC BLOOD PRESSURE: 125 MMHG | HEART RATE: 80 BPM | DIASTOLIC BLOOD PRESSURE: 81 MMHG | WEIGHT: 229 LBS | HEIGHT: 61.5 IN | BODY MASS INDEX: 42.68 KG/M2

## 2020-10-28 DIAGNOSIS — Z01.818 PREOPERATIVE EXAMINATION: Primary | ICD-10-CM

## 2020-10-28 DIAGNOSIS — Z01.818 PREOPERATIVE EXAMINATION: ICD-10-CM

## 2020-10-28 PROCEDURE — 36415 COLL VENOUS BLD VENIPUNCTURE: CPT

## 2020-10-28 PROCEDURE — 93000 ELECTROCARDIOGRAM COMPLETE: CPT | Performed by: FAMILY MEDICINE

## 2020-10-28 PROCEDURE — 85025 COMPLETE CBC W/AUTO DIFF WBC: CPT

## 2020-10-28 PROCEDURE — 3008F BODY MASS INDEX DOCD: CPT | Performed by: FAMILY MEDICINE

## 2020-10-28 PROCEDURE — 85610 PROTHROMBIN TIME: CPT

## 2020-10-28 PROCEDURE — 3074F SYST BP LT 130 MM HG: CPT | Performed by: FAMILY MEDICINE

## 2020-10-28 PROCEDURE — 99214 OFFICE O/P EST MOD 30 MIN: CPT | Performed by: FAMILY MEDICINE

## 2020-10-28 PROCEDURE — 3079F DIAST BP 80-89 MM HG: CPT | Performed by: FAMILY MEDICINE

## 2020-10-28 PROCEDURE — 80053 COMPREHEN METABOLIC PANEL: CPT

## 2020-10-28 PROCEDURE — 85730 THROMBOPLASTIN TIME PARTIAL: CPT

## 2020-10-28 NOTE — PROGRESS NOTES
HPI:    Babita Cohen is a 35year old female presents to clinic for preoperative exam.  On 11/6/2020, patient is having a breast reduction and abdominoplasty with liposuction done by Dr. Wood Rene. Needs clearance. Currently asymptomatic.   No oropharynx is clear and moist and mucous membranes are normal.   Eyes: Pupils are equal, round, and reactive to light. Conjunctivae and EOM are normal.   Neck: Normal range of motion. Neck supple. No thyromegaly present.    Cardiovascular: Normal rate, regu MD

## 2020-11-03 ENCOUNTER — APPOINTMENT (OUTPATIENT)
Dept: LAB | Age: 30
End: 2020-11-03
Attending: SPECIALIST
Payer: COMMERCIAL

## 2020-11-03 DIAGNOSIS — Z01.818 PREOP TESTING: ICD-10-CM

## 2020-11-04 RX ORDER — MULTIVITAMIN
1 TABLET ORAL DAILY
COMMUNITY

## 2020-11-06 ENCOUNTER — HOSPITAL ENCOUNTER (OUTPATIENT)
Facility: HOSPITAL | Age: 30
Discharge: HOME | End: 2020-11-07
Attending: SPECIALIST | Admitting: SPECIALIST
Payer: COMMERCIAL

## 2020-11-06 ENCOUNTER — ANESTHESIA (OUTPATIENT)
Dept: SURGERY | Facility: HOSPITAL | Age: 30
End: 2020-11-06
Payer: COMMERCIAL

## 2020-11-06 ENCOUNTER — ANESTHESIA EVENT (OUTPATIENT)
Dept: SURGERY | Facility: HOSPITAL | Age: 30
End: 2020-11-06
Payer: COMMERCIAL

## 2020-11-06 DIAGNOSIS — Z01.818 PREOP TESTING: Primary | ICD-10-CM

## 2020-11-06 PROBLEM — N62 MACROMASTIA: Status: ACTIVE | Noted: 2020-11-06

## 2020-11-06 PROCEDURE — 0J080ZZ ALTERATION OF ABDOMEN SUBCUTANEOUS TISSUE AND FASCIA, OPEN APPROACH: ICD-10-PCS | Performed by: SPECIALIST

## 2020-11-06 PROCEDURE — 0J0M3ZZ ALTERATION OF LEFT UPPER LEG SUBCUTANEOUS TISSUE AND FASCIA, PERCUTANEOUS APPROACH: ICD-10-PCS | Performed by: SPECIALIST

## 2020-11-06 PROCEDURE — 88305 TISSUE EXAM BY PATHOLOGIST: CPT | Performed by: SPECIALIST

## 2020-11-06 PROCEDURE — 0HBV0ZZ EXCISION OF BILATERAL BREAST, OPEN APPROACH: ICD-10-PCS | Performed by: SPECIALIST

## 2020-11-06 PROCEDURE — 81025 URINE PREGNANCY TEST: CPT

## 2020-11-06 PROCEDURE — 0J0L3ZZ ALTERATION OF RIGHT UPPER LEG SUBCUTANEOUS TISSUE AND FASCIA, PERCUTANEOUS APPROACH: ICD-10-PCS | Performed by: SPECIALIST

## 2020-11-06 RX ORDER — LIDOCAINE HYDROCHLORIDE 10 MG/ML
INJECTION, SOLUTION EPIDURAL; INFILTRATION; INTRACAUDAL; PERINEURAL AS NEEDED
Status: DISCONTINUED | OUTPATIENT
Start: 2020-11-06 | End: 2020-11-06 | Stop reason: SURG

## 2020-11-06 RX ORDER — HYDROMORPHONE HYDROCHLORIDE 1 MG/ML
0.4 INJECTION, SOLUTION INTRAMUSCULAR; INTRAVENOUS; SUBCUTANEOUS EVERY 5 MIN PRN
Status: DISCONTINUED | OUTPATIENT
Start: 2020-11-06 | End: 2020-11-06 | Stop reason: HOSPADM

## 2020-11-06 RX ORDER — HYDROCODONE BITARTRATE AND ACETAMINOPHEN 5; 325 MG/1; MG/1
2 TABLET ORAL AS NEEDED
Status: DISCONTINUED | OUTPATIENT
Start: 2020-11-06 | End: 2020-11-06 | Stop reason: HOSPADM

## 2020-11-06 RX ORDER — CEFAZOLIN SODIUM/WATER 2 G/20 ML
2 SYRINGE (ML) INTRAVENOUS ONCE
Status: COMPLETED | OUTPATIENT
Start: 2020-11-06 | End: 2020-11-06

## 2020-11-06 RX ORDER — PHENYLEPHRINE HCL 10 MG/ML
VIAL (ML) INJECTION AS NEEDED
Status: DISCONTINUED | OUTPATIENT
Start: 2020-11-06 | End: 2020-11-06 | Stop reason: SURG

## 2020-11-06 RX ORDER — DEXTROSE, SODIUM CHLORIDE, SODIUM LACTATE, POTASSIUM CHLORIDE, AND CALCIUM CHLORIDE 5; .6; .31; .03; .02 G/100ML; G/100ML; G/100ML; G/100ML; G/100ML
INJECTION, SOLUTION INTRAVENOUS CONTINUOUS
Status: DISCONTINUED | OUTPATIENT
Start: 2020-11-06 | End: 2020-11-07

## 2020-11-06 RX ORDER — MORPHINE SULFATE 2 MG/ML
2 INJECTION, SOLUTION INTRAMUSCULAR; INTRAVENOUS EVERY 2 HOUR PRN
Status: DISCONTINUED | OUTPATIENT
Start: 2020-11-06 | End: 2020-11-07

## 2020-11-06 RX ORDER — ONDANSETRON 2 MG/ML
INJECTION INTRAMUSCULAR; INTRAVENOUS AS NEEDED
Status: DISCONTINUED | OUTPATIENT
Start: 2020-11-06 | End: 2020-11-06 | Stop reason: SURG

## 2020-11-06 RX ORDER — METOCLOPRAMIDE 10 MG/1
10 TABLET ORAL ONCE
Status: COMPLETED | OUTPATIENT
Start: 2020-11-06 | End: 2020-11-06

## 2020-11-06 RX ORDER — HYDROMORPHONE HYDROCHLORIDE 1 MG/ML
INJECTION, SOLUTION INTRAMUSCULAR; INTRAVENOUS; SUBCUTANEOUS AS NEEDED
Status: DISCONTINUED | OUTPATIENT
Start: 2020-11-06 | End: 2020-11-06 | Stop reason: SURG

## 2020-11-06 RX ORDER — EPHEDRINE SULFATE 50 MG/ML
INJECTION, SOLUTION INTRAVENOUS AS NEEDED
Status: DISCONTINUED | OUTPATIENT
Start: 2020-11-06 | End: 2020-11-06 | Stop reason: SURG

## 2020-11-06 RX ORDER — MORPHINE SULFATE 2 MG/ML
1 INJECTION, SOLUTION INTRAMUSCULAR; INTRAVENOUS EVERY 2 HOUR PRN
Status: DISCONTINUED | OUTPATIENT
Start: 2020-11-06 | End: 2020-11-07

## 2020-11-06 RX ORDER — ONDANSETRON 2 MG/ML
8 INJECTION INTRAMUSCULAR; INTRAVENOUS EVERY 4 HOURS PRN
Status: DISCONTINUED | OUTPATIENT
Start: 2020-11-06 | End: 2020-11-07

## 2020-11-06 RX ORDER — DIAPER,BRIEF,INFANT-TODD,DISP
EACH MISCELLANEOUS AS NEEDED
Status: DISCONTINUED | OUTPATIENT
Start: 2020-11-06 | End: 2020-11-06 | Stop reason: HOSPADM

## 2020-11-06 RX ORDER — DEXAMETHASONE SODIUM PHOSPHATE 4 MG/ML
VIAL (ML) INJECTION AS NEEDED
Status: DISCONTINUED | OUTPATIENT
Start: 2020-11-06 | End: 2020-11-06 | Stop reason: SURG

## 2020-11-06 RX ORDER — HYDROCODONE BITARTRATE AND ACETAMINOPHEN 10; 325 MG/1; MG/1
1 TABLET ORAL EVERY 4 HOURS PRN
Status: DISCONTINUED | OUTPATIENT
Start: 2020-11-06 | End: 2020-11-07

## 2020-11-06 RX ORDER — NALOXONE HYDROCHLORIDE 0.4 MG/ML
80 INJECTION, SOLUTION INTRAMUSCULAR; INTRAVENOUS; SUBCUTANEOUS AS NEEDED
Status: DISCONTINUED | OUTPATIENT
Start: 2020-11-06 | End: 2020-11-06 | Stop reason: HOSPADM

## 2020-11-06 RX ORDER — HYDROCODONE BITARTRATE AND ACETAMINOPHEN 10; 325 MG/1; MG/1
2 TABLET ORAL EVERY 4 HOURS PRN
Status: DISCONTINUED | OUTPATIENT
Start: 2020-11-06 | End: 2020-11-07

## 2020-11-06 RX ORDER — HYDROMORPHONE HYDROCHLORIDE 1 MG/ML
0.6 INJECTION, SOLUTION INTRAMUSCULAR; INTRAVENOUS; SUBCUTANEOUS EVERY 5 MIN PRN
Status: DISCONTINUED | OUTPATIENT
Start: 2020-11-06 | End: 2020-11-06 | Stop reason: HOSPADM

## 2020-11-06 RX ORDER — ESMOLOL HYDROCHLORIDE 10 MG/ML
INJECTION INTRAVENOUS AS NEEDED
Status: DISCONTINUED | OUTPATIENT
Start: 2020-11-06 | End: 2020-11-06 | Stop reason: SURG

## 2020-11-06 RX ORDER — SODIUM CHLORIDE, SODIUM LACTATE, POTASSIUM CHLORIDE, CALCIUM CHLORIDE 600; 310; 30; 20 MG/100ML; MG/100ML; MG/100ML; MG/100ML
INJECTION, SOLUTION INTRAVENOUS CONTINUOUS
Status: DISCONTINUED | OUTPATIENT
Start: 2020-11-06 | End: 2020-11-06 | Stop reason: HOSPADM

## 2020-11-06 RX ORDER — MIDAZOLAM HYDROCHLORIDE 1 MG/ML
INJECTION INTRAMUSCULAR; INTRAVENOUS AS NEEDED
Status: DISCONTINUED | OUTPATIENT
Start: 2020-11-06 | End: 2020-11-06 | Stop reason: SURG

## 2020-11-06 RX ORDER — ACETAMINOPHEN 500 MG
1000 TABLET ORAL ONCE
Status: COMPLETED | OUTPATIENT
Start: 2020-11-06 | End: 2020-11-06

## 2020-11-06 RX ORDER — SODIUM CHLORIDE 0.9 % (FLUSH) 0.9 %
10 SYRINGE (ML) INJECTION AS NEEDED
Status: DISCONTINUED | OUTPATIENT
Start: 2020-11-06 | End: 2020-11-07

## 2020-11-06 RX ORDER — ACETAMINOPHEN 325 MG/1
650 TABLET ORAL EVERY 4 HOURS PRN
Status: DISCONTINUED | OUTPATIENT
Start: 2020-11-06 | End: 2020-11-07

## 2020-11-06 RX ORDER — FAMOTIDINE 20 MG/1
20 TABLET ORAL ONCE
Status: COMPLETED | OUTPATIENT
Start: 2020-11-06 | End: 2020-11-06

## 2020-11-06 RX ORDER — MORPHINE SULFATE 4 MG/ML
2 INJECTION, SOLUTION INTRAMUSCULAR; INTRAVENOUS EVERY 10 MIN PRN
Status: DISCONTINUED | OUTPATIENT
Start: 2020-11-06 | End: 2020-11-06 | Stop reason: HOSPADM

## 2020-11-06 RX ORDER — HYDROMORPHONE HYDROCHLORIDE 1 MG/ML
0.2 INJECTION, SOLUTION INTRAMUSCULAR; INTRAVENOUS; SUBCUTANEOUS EVERY 5 MIN PRN
Status: DISCONTINUED | OUTPATIENT
Start: 2020-11-06 | End: 2020-11-06 | Stop reason: HOSPADM

## 2020-11-06 RX ORDER — HALOPERIDOL 5 MG/ML
0.25 INJECTION INTRAMUSCULAR ONCE AS NEEDED
Status: DISCONTINUED | OUTPATIENT
Start: 2020-11-06 | End: 2020-11-06 | Stop reason: HOSPADM

## 2020-11-06 RX ORDER — ROCURONIUM BROMIDE 10 MG/ML
INJECTION, SOLUTION INTRAVENOUS AS NEEDED
Status: DISCONTINUED | OUTPATIENT
Start: 2020-11-06 | End: 2020-11-06 | Stop reason: SURG

## 2020-11-06 RX ORDER — METHYLENE BLUE 10 MG/ML
INJECTION INTRAVENOUS AS NEEDED
Status: DISCONTINUED | OUTPATIENT
Start: 2020-11-06 | End: 2020-11-06 | Stop reason: HOSPADM

## 2020-11-06 RX ORDER — PROCHLORPERAZINE EDISYLATE 5 MG/ML
10 INJECTION INTRAMUSCULAR; INTRAVENOUS EVERY 6 HOURS PRN
Status: DISCONTINUED | OUTPATIENT
Start: 2020-11-06 | End: 2020-11-07

## 2020-11-06 RX ORDER — TEMAZEPAM 15 MG/1
15 CAPSULE ORAL NIGHTLY PRN
Status: DISCONTINUED | OUTPATIENT
Start: 2020-11-06 | End: 2020-11-07

## 2020-11-06 RX ORDER — MORPHINE SULFATE 4 MG/ML
4 INJECTION, SOLUTION INTRAMUSCULAR; INTRAVENOUS EVERY 10 MIN PRN
Status: DISCONTINUED | OUTPATIENT
Start: 2020-11-06 | End: 2020-11-06 | Stop reason: HOSPADM

## 2020-11-06 RX ORDER — DOCUSATE SODIUM 100 MG/1
100 CAPSULE, LIQUID FILLED ORAL 2 TIMES DAILY
Status: DISCONTINUED | OUTPATIENT
Start: 2020-11-06 | End: 2020-11-07

## 2020-11-06 RX ORDER — MORPHINE SULFATE 10 MG/ML
6 INJECTION, SOLUTION INTRAMUSCULAR; INTRAVENOUS EVERY 10 MIN PRN
Status: DISCONTINUED | OUTPATIENT
Start: 2020-11-06 | End: 2020-11-06 | Stop reason: HOSPADM

## 2020-11-06 RX ORDER — PROCHLORPERAZINE EDISYLATE 5 MG/ML
5 INJECTION INTRAMUSCULAR; INTRAVENOUS ONCE AS NEEDED
Status: DISCONTINUED | OUTPATIENT
Start: 2020-11-06 | End: 2020-11-06 | Stop reason: HOSPADM

## 2020-11-06 RX ORDER — HYDROCODONE BITARTRATE AND ACETAMINOPHEN 5; 325 MG/1; MG/1
1 TABLET ORAL AS NEEDED
Status: DISCONTINUED | OUTPATIENT
Start: 2020-11-06 | End: 2020-11-06 | Stop reason: HOSPADM

## 2020-11-06 RX ORDER — ONDANSETRON 2 MG/ML
4 INJECTION INTRAMUSCULAR; INTRAVENOUS ONCE AS NEEDED
Status: DISCONTINUED | OUTPATIENT
Start: 2020-11-06 | End: 2020-11-06 | Stop reason: HOSPADM

## 2020-11-06 RX ORDER — MORPHINE SULFATE 4 MG/ML
4 INJECTION, SOLUTION INTRAMUSCULAR; INTRAVENOUS EVERY 2 HOUR PRN
Status: DISCONTINUED | OUTPATIENT
Start: 2020-11-06 | End: 2020-11-07

## 2020-11-06 RX ORDER — ACETAMINOPHEN 10 MG/ML
INJECTION, SOLUTION INTRAVENOUS AS NEEDED
Status: DISCONTINUED | OUTPATIENT
Start: 2020-11-06 | End: 2020-11-06 | Stop reason: SURG

## 2020-11-06 RX ORDER — SODIUM CHLORIDE 9 MG/ML
INJECTION, SOLUTION INTRAVENOUS CONTINUOUS PRN
Status: DISCONTINUED | OUTPATIENT
Start: 2020-11-06 | End: 2020-11-06 | Stop reason: SURG

## 2020-11-06 RX ORDER — CEFAZOLIN SODIUM/WATER 2 G/20 ML
2 SYRINGE (ML) INTRAVENOUS EVERY 8 HOURS
Status: DISCONTINUED | OUTPATIENT
Start: 2020-11-06 | End: 2020-11-07

## 2020-11-06 RX ADMIN — ROCURONIUM BROMIDE 30 MG: 10 INJECTION, SOLUTION INTRAVENOUS at 11:20:00

## 2020-11-06 RX ADMIN — ACETAMINOPHEN 1000 MG: 10 INJECTION, SOLUTION INTRAVENOUS at 16:20:00

## 2020-11-06 RX ADMIN — PHENYLEPHRINE HCL 50 MCG: 10 MG/ML VIAL (ML) INJECTION at 15:28:00

## 2020-11-06 RX ADMIN — ESMOLOL HYDROCHLORIDE 5 MG: 10 INJECTION INTRAVENOUS at 11:31:00

## 2020-11-06 RX ADMIN — CEFAZOLIN SODIUM/WATER 2 G: 2 G/20 ML SYRINGE (ML) INTRAVENOUS at 11:20:00

## 2020-11-06 RX ADMIN — CEFAZOLIN SODIUM/WATER 2 G: 2 G/20 ML SYRINGE (ML) INTRAVENOUS at 15:20:00

## 2020-11-06 RX ADMIN — PHENYLEPHRINE HCL 100 MCG: 10 MG/ML VIAL (ML) INJECTION at 13:26:00

## 2020-11-06 RX ADMIN — ONDANSETRON 4 MG: 2 INJECTION INTRAMUSCULAR; INTRAVENOUS at 11:21:00

## 2020-11-06 RX ADMIN — SODIUM CHLORIDE, SODIUM LACTATE, POTASSIUM CHLORIDE, CALCIUM CHLORIDE: 600; 310; 30; 20 INJECTION, SOLUTION INTRAVENOUS at 12:06:00

## 2020-11-06 RX ADMIN — SODIUM CHLORIDE: 9 INJECTION, SOLUTION INTRAVENOUS at 13:30:00

## 2020-11-06 RX ADMIN — DEXAMETHASONE SODIUM PHOSPHATE 4 MG: 4 MG/ML VIAL (ML) INJECTION at 11:21:00

## 2020-11-06 RX ADMIN — MIDAZOLAM HYDROCHLORIDE 2 MG: 1 INJECTION INTRAMUSCULAR; INTRAVENOUS at 11:12:00

## 2020-11-06 RX ADMIN — PHENYLEPHRINE HCL 50 MCG: 10 MG/ML VIAL (ML) INJECTION at 15:26:00

## 2020-11-06 RX ADMIN — SODIUM CHLORIDE, SODIUM LACTATE, POTASSIUM CHLORIDE, CALCIUM CHLORIDE: 600; 310; 30; 20 INJECTION, SOLUTION INTRAVENOUS at 12:05:00

## 2020-11-06 RX ADMIN — ONDANSETRON 4 MG: 2 INJECTION INTRAMUSCULAR; INTRAVENOUS at 16:08:00

## 2020-11-06 RX ADMIN — SODIUM CHLORIDE: 9 INJECTION, SOLUTION INTRAVENOUS at 14:46:00

## 2020-11-06 RX ADMIN — SODIUM CHLORIDE, SODIUM LACTATE, POTASSIUM CHLORIDE, CALCIUM CHLORIDE: 600; 310; 30; 20 INJECTION, SOLUTION INTRAVENOUS at 14:36:00

## 2020-11-06 RX ADMIN — SODIUM CHLORIDE: 9 INJECTION, SOLUTION INTRAVENOUS at 16:09:00

## 2020-11-06 RX ADMIN — PHENYLEPHRINE HCL 50 MCG: 10 MG/ML VIAL (ML) INJECTION at 15:31:00

## 2020-11-06 RX ADMIN — ROCURONIUM BROMIDE 10 MG: 10 INJECTION, SOLUTION INTRAVENOUS at 15:18:00

## 2020-11-06 RX ADMIN — ROCURONIUM BROMIDE 10 MG: 10 INJECTION, SOLUTION INTRAVENOUS at 11:12:00

## 2020-11-06 RX ADMIN — ROCURONIUM BROMIDE 20 MG: 10 INJECTION, SOLUTION INTRAVENOUS at 14:36:00

## 2020-11-06 RX ADMIN — HYDROMORPHONE HYDROCHLORIDE 0.5 MG: 1 INJECTION, SOLUTION INTRAMUSCULAR; INTRAVENOUS; SUBCUTANEOUS at 16:20:00

## 2020-11-06 RX ADMIN — ROCURONIUM BROMIDE 10 MG: 10 INJECTION, SOLUTION INTRAVENOUS at 13:02:00

## 2020-11-06 RX ADMIN — SODIUM CHLORIDE, SODIUM LACTATE, POTASSIUM CHLORIDE, CALCIUM CHLORIDE: 600; 310; 30; 20 INJECTION, SOLUTION INTRAVENOUS at 13:29:00

## 2020-11-06 RX ADMIN — LIDOCAINE HYDROCHLORIDE 50 MG: 10 INJECTION, SOLUTION EPIDURAL; INFILTRATION; INTRACAUDAL; PERINEURAL at 11:12:00

## 2020-11-06 RX ADMIN — ESMOLOL HYDROCHLORIDE 10 MG: 10 INJECTION INTRAVENOUS at 11:21:00

## 2020-11-06 RX ADMIN — SODIUM CHLORIDE, SODIUM LACTATE, POTASSIUM CHLORIDE, CALCIUM CHLORIDE: 600; 310; 30; 20 INJECTION, SOLUTION INTRAVENOUS at 13:30:00

## 2020-11-06 RX ADMIN — EPHEDRINE SULFATE 5 MG: 50 INJECTION, SOLUTION INTRAVENOUS at 15:31:00

## 2020-11-06 RX ADMIN — SODIUM CHLORIDE, SODIUM LACTATE, POTASSIUM CHLORIDE, CALCIUM CHLORIDE: 600; 310; 30; 20 INJECTION, SOLUTION INTRAVENOUS at 14:37:00

## 2020-11-06 NOTE — DISCHARGE SUMMARY
[facility]OS[facility] - [facility]    Discharge Summary    Lakhwinder Fuchs Patient Status:  Outpatient in a Bed    3/24/1990 MRN [id number]   Location [place] [address] Attending Francisca Bailey MD   Hosp Day # 0 PCP Wild Claire MD     Da drain skin sites or pain pump sites. May remove garment briefly if needed, then replace promptly. Begin antibiotics tonight as instructed; Pain medication as needed for pain. Ambulate and deep breathe regularly.   Hold on shower until pain pumps removed;

## 2020-11-06 NOTE — BRIEF OP NOTE
Pre-Operative Diagnosis: macromastia, hypertrophy of breast and excess skin, fat of abdomen, hips and mons pubis     Post-Operative Diagnosis: macromastia, hypertrophy of breast and excess skin, fat of abdomen, hips and mons pubis      Procedure Performed

## 2020-11-06 NOTE — ANESTHESIA PREPROCEDURE EVALUATION
Anesthesia PreOp Note    HPI:     Beth Dow is a 35year old female who presents for preoperative consultation requested by: Antonella Rodriguez MD    Date of Surgery: 11/6/2020    Procedure(s):  BREAST REDUCTION  ABDOMINOPLASTY  LIPOSUCTION THIGH  In Problem Relation Age of Onset   • Alcohol and Other Disorders Associated Father         alcoholism   • Diabetes Father    • Hypertension Mother    • Other (Other) Mother         sarcodosis   • Stroke Maternal Grandmother    • Diabetes Paternal Grandmother HGB 12.8 10/28/2020    HCT 39.8 10/28/2020    MCV 90.7 10/28/2020    MCH 29.2 10/28/2020    MCHC 32.2 10/28/2020    RDW 12.1 10/28/2020    .0 10/28/2020    PREGS Negative 08/12/2020    URINEPREG Negative 11/06/2020     Lab Results   Component Value I have informed Sage Daniel of the nature of the anesthetic plan, benefits, risks including possible dental damage if relevant, major complications, and any alternative forms of anesthetic management.    All of the patient's questions were answered to the

## 2020-11-06 NOTE — ANESTHESIA POSTPROCEDURE EVALUATION
Patient: Jody Tillman    Procedure Summary     Date: 11/06/20 Room / Location: [facility] OR 02 / [facility] OR    Anesthesia Start: 1108 Anesthesia Stop: 9087    Procedures:       BREAST REDUCTION (Bilateral Breast)      ABDOMINOPLASTY (N/A Abdomen)      LIP

## 2020-11-06 NOTE — ANESTHESIA PROCEDURE NOTES
Airway  Urgency: Elective      General Information and Staff    Patient location during procedure: OR  Anesthesiologist: Emilio Ramirez MD  Resident/CRNA: Kera Street CRNA  Performed: CRNA     Indications and Patient Condition  Indications for ai

## 2020-11-06 NOTE — H&P
History & Physical Examination    Patient Name: Inge Escalante  MRN: [id number]  CSN: [id number]  YOB: 1990    Diagnosis: Macromastia and excess skin/fat abdomen    Present Illness: 36 yo presents with complaints of macromastia and excess skin EXTREMITIES [ x] [ ]    Daniella Juan [ ] [x ] macromastia   OTHER        [ x ] I have discussed the risks and benefits and alternatives with the patient/family.   [ x ] The above referenced H&P was reviewed by Carmen Oliva MD on 11/6/2020 the patient was

## 2020-11-07 VITALS
HEART RATE: 93 BPM | WEIGHT: 223 LBS | DIASTOLIC BLOOD PRESSURE: 66 MMHG | SYSTOLIC BLOOD PRESSURE: 120 MMHG | TEMPERATURE: 98 F | BODY MASS INDEX: 41.04 KG/M2 | OXYGEN SATURATION: 97 % | RESPIRATION RATE: 18 BRPM | HEIGHT: 62 IN

## 2020-11-07 PROBLEM — Z01.818 PREOP TESTING: Status: RESOLVED | Noted: 2020-11-06 | Resolved: 2020-11-07

## 2020-11-07 PROBLEM — N62 MACROMASTIA: Status: RESOLVED | Noted: 2020-11-06 | Resolved: 2020-11-07

## 2020-11-07 NOTE — OPERATIVE REPORT
[facility]    PATIENT'S NAME: Kristie Nagy   ATTENDING PHYSICIAN: Agapito Ferreira. MD Neeru   OPERATING PHYSICIAN: Shagufta Marquez MD   PATIENT ACCOUNT#:   [id number]    [facility] [place] [address] #:   [id number]       DATE bilateral breast reduction with DEEPTHI drain and pain pump placement, as well as a full abdominoplasty with liposuction of the hips and the mons pubis. We discussed both operations in detail at 2 separate preoperative visits.   She was made aware of the risks a 10 blade for a distance of approximately 2 cm. The skin flaps were elevated off the underlying breast gland, keeping the thickness for approximately 2 cm.   At this point, the breast glands were debulked in appropriate size for the patient's body habitus infiltrated throughout the hips, mons pubis, and the abdominal pannus area. A 3.0 mm  liposuction cannula was used to lipoaspirate a total of 1000 mL of fatty tissue from the hips and the mons pubis.   The treated areas were examined for symmetry, found to sharply. Bipolar was used for hemostasis along the fresh incisional edges and then the original umbilicus was brought out and sewn in place with 3-0 Vicryl and 4-0 Prolene. The drains were secured with 3-0 nylon sutures.   Biopatches, Mastisol, and Ambikaade

## 2020-11-07 NOTE — PLAN OF CARE
Pt adequate for D/C. Discharge med rec completed. Surgical and MD cleared. Scripts confirmed at home. verbalized comfort with PO pain meds. Ambulation safety measures taught. Discharge instructions presented and reviewed. Medication side effects discussed.

## 2020-11-07 NOTE — PROGRESS NOTES
[facility]    Progress Note    Jody Tillman Patient Status:  Outpatient in a Bed    3/24/1990 MRN [id number]   Location [facility] 4W/SW/SE Attending Britton Holguin MD   Hosp Day # 0 PCP Senait Barnett MD     Subjective:

## 2020-11-07 NOTE — PLAN OF CARE
Patient is A&Ox4. Pt had one episode of dizziness and emesis when getting up to use bathroom. Zofran given prn. Pt felt better throughout night. VSS, afebrile. IVF infusing. Ancef. Voiding WNL. -Gas overnight. Clear liquid diet.  Bilateral breast pain pumps physical deficits and behaviors that affect risk of falls.   - [place] fall precautions as indicated by assessment.  - Educate pt/family on patient safety including physical limitations  - Instruct pt to call for assistance with activity based on assessme

## 2020-11-07 NOTE — PLAN OF CARE
Comments: Pt alert, calling appropriately. Voiding adequately. Tolerating intake - absent of N/V. Verbalized comfort. OOB xSBA. JPs drained/output recorded. Dressings re-inforced as needed. Plan is d/c to home, today.       Problem: Patient Centered Care  G injury  - Provide assistive devices as appropriate  - Consider OT/PT consult to assist with strengthening/mobility  - Encourage toileting schedule  Outcome: Progressing     Problem: DISCHARGE PLANNING  Goal: Discharge to home or other facility with appropr

## 2020-11-20 RX ORDER — NORETHINDRONE ACETATE AND ETHINYL ESTRADIOL AND FERROUS FUMARATE 1.5-30(21)
KIT ORAL
Qty: 84 TABLET | Refills: 1 | Status: SHIPPED | OUTPATIENT
Start: 2020-11-20 | End: 2021-05-13

## 2021-05-13 RX ORDER — NORETHINDRONE ACETATE AND ETHINYL ESTRADIOL 1.5-30(21)
KIT ORAL
Qty: 84 TABLET | Refills: 1 | Status: SHIPPED | OUTPATIENT
Start: 2021-05-13 | End: 2021-08-10

## 2021-08-06 NOTE — ED INITIAL ASSESSMENT (HPI)
Pt states was very upset so decided to go to bed. Pt states woke up and had chest tightness and states was unable to catch breath.

## 2021-08-06 NOTE — ED PROVIDER NOTES
Patient Seen in: [facility]      History   No chief complaint on file. Stated Complaint: breathing issues     HPI/Subjective:   Well-appearing 80-year-old female presents with multiple concerns.   Patient communicates that yesterday Nuvia Qureshi air)       Current:/83   Pulse 80   Temp 98.1 °F (36.7 °C) (Temporal)   Resp 20   LMP 07/06/2021   SpO2 100%         Physical Exam  VS: Vital signs reviewed. 02 saturation within normal limits for this patient.     General: Patient is awake and alert, Dr. Arelis Castaneda by phone, who agrees with plan of care and disposition.   Disposition and Plan     Clinical Impression:  Anxiety  (primary encounter diagnosis)  Chest pain, non-cardiac     Disposition:  Discharge  8/6/2021 12:25 pm    Follow-up:  Salma Rosenberg

## 2021-08-10 RX ORDER — NORETHINDRONE ACETATE AND ETHINYL ESTRADIOL AND FERROUS FUMARATE 1.5-30(21)
KIT ORAL
Qty: 84 TABLET | Refills: 1 | Status: SHIPPED | OUTPATIENT
Start: 2021-08-10

## 2021-09-20 ENCOUNTER — LAB ENCOUNTER (OUTPATIENT)
Dept: LAB | Age: 31
End: 2021-09-20
Attending: FAMILY MEDICINE
Payer: COMMERCIAL

## 2021-09-20 ENCOUNTER — OFFICE VISIT (OUTPATIENT)
Dept: FAMILY MEDICINE CLINIC | Facility: CLINIC | Age: 31
End: 2021-09-20
Payer: COMMERCIAL

## 2021-09-20 VITALS
TEMPERATURE: 98 F | DIASTOLIC BLOOD PRESSURE: 83 MMHG | HEART RATE: 79 BPM | HEIGHT: 62 IN | WEIGHT: 222 LBS | SYSTOLIC BLOOD PRESSURE: 127 MMHG | BODY MASS INDEX: 40.85 KG/M2

## 2021-09-20 DIAGNOSIS — Z00.00 ANNUAL PHYSICAL EXAM: Primary | ICD-10-CM

## 2021-09-20 DIAGNOSIS — B36.0 TINEA VERSICOLOR: ICD-10-CM

## 2021-09-20 DIAGNOSIS — F41.9 ANXIETY: ICD-10-CM

## 2021-09-20 DIAGNOSIS — Z00.00 ANNUAL PHYSICAL EXAM: ICD-10-CM

## 2021-09-20 LAB
ALBUMIN SERPL-MCNC: 3.3 G/DL (ref 3.4–5)
ALBUMIN/GLOB SERPL: 0.8 {RATIO} (ref 1–2)
ALP LIVER SERPL-CCNC: 29 U/L
ALT SERPL-CCNC: 19 U/L
ANION GAP SERPL CALC-SCNC: 6 MMOL/L (ref 0–18)
AST SERPL-CCNC: 14 U/L (ref 15–37)
BASOPHILS # BLD AUTO: 0.01 X10(3) UL (ref 0–0.2)
BASOPHILS NFR BLD AUTO: 0.1 %
BILIRUB SERPL-MCNC: 0.7 MG/DL (ref 0.1–2)
BUN BLD-MCNC: 7 MG/DL (ref 7–18)
BUN/CREAT SERPL: 9.3 (ref 10–20)
CALCIUM BLD-MCNC: 8.5 MG/DL (ref 8.5–10.1)
CHLORIDE SERPL-SCNC: 107 MMOL/L (ref 98–112)
CHOLEST SERPL-MCNC: 105 MG/DL (ref ?–200)
CO2 SERPL-SCNC: 25 MMOL/L (ref 21–32)
CREAT BLD-MCNC: 0.75 MG/DL
DEPRECATED RDW RBC AUTO: 38.7 FL (ref 35.1–46.3)
EOSINOPHIL # BLD AUTO: 0.05 X10(3) UL (ref 0–0.7)
EOSINOPHIL NFR BLD AUTO: 0.7 %
ERYTHROCYTE [DISTWIDTH] IN BLOOD BY AUTOMATED COUNT: 11.9 % (ref 11–15)
GLOBULIN PLAS-MCNC: 3.9 G/DL (ref 2.8–4.4)
GLUCOSE BLD-MCNC: 73 MG/DL (ref 70–99)
HCT VFR BLD AUTO: 36.8 %
HDLC SERPL-MCNC: 50 MG/DL (ref 40–59)
HGB BLD-MCNC: 12 G/DL
IMM GRANULOCYTES # BLD AUTO: 0.01 X10(3) UL (ref 0–1)
IMM GRANULOCYTES NFR BLD: 0.1 %
LDLC SERPL CALC-MCNC: 41 MG/DL (ref ?–100)
LYMPHOCYTES # BLD AUTO: 2.41 X10(3) UL (ref 1–4)
LYMPHOCYTES NFR BLD AUTO: 34.9 %
MCH RBC QN AUTO: 29.2 PG (ref 26–34)
MCHC RBC AUTO-ENTMCNC: 32.6 G/DL (ref 31–37)
MCV RBC AUTO: 89.5 FL
MONOCYTES # BLD AUTO: 0.44 X10(3) UL (ref 0.1–1)
MONOCYTES NFR BLD AUTO: 6.4 %
NEUTROPHILS # BLD AUTO: 3.99 X10 (3) UL (ref 1.5–7.7)
NEUTROPHILS # BLD AUTO: 3.99 X10(3) UL (ref 1.5–7.7)
NEUTROPHILS NFR BLD AUTO: 57.8 %
NONHDLC SERPL-MCNC: 55 MG/DL (ref ?–130)
OSMOLALITY SERPL CALC.SUM OF ELEC: 283 MOSM/KG (ref 275–295)
PATIENT FASTING Y/N/NP: YES
PATIENT FASTING Y/N/NP: YES
PLATELET # BLD AUTO: 316 10(3)UL (ref 150–450)
POTASSIUM SERPL-SCNC: 3.7 MMOL/L (ref 3.5–5.1)
PROT SERPL-MCNC: 7.2 G/DL (ref 6.4–8.2)
RBC # BLD AUTO: 4.11 X10(6)UL
SODIUM SERPL-SCNC: 138 MMOL/L (ref 136–145)
TRIGL SERPL-MCNC: 60 MG/DL (ref 30–149)
TSI SER-ACNC: 0.81 MIU/ML (ref 0.36–3.74)
VLDLC SERPL CALC-MCNC: 8 MG/DL (ref 0–30)
WBC # BLD AUTO: 6.9 X10(3) UL (ref 4–11)

## 2021-09-20 PROCEDURE — 80053 COMPREHEN METABOLIC PANEL: CPT

## 2021-09-20 PROCEDURE — 99213 OFFICE O/P EST LOW 20 MIN: CPT | Performed by: FAMILY MEDICINE

## 2021-09-20 PROCEDURE — 36415 COLL VENOUS BLD VENIPUNCTURE: CPT

## 2021-09-20 PROCEDURE — 3008F BODY MASS INDEX DOCD: CPT | Performed by: FAMILY MEDICINE

## 2021-09-20 PROCEDURE — 99395 PREV VISIT EST AGE 18-39: CPT | Performed by: FAMILY MEDICINE

## 2021-09-20 PROCEDURE — 80061 LIPID PANEL: CPT

## 2021-09-20 PROCEDURE — 3079F DIAST BP 80-89 MM HG: CPT | Performed by: FAMILY MEDICINE

## 2021-09-20 PROCEDURE — 84443 ASSAY THYROID STIM HORMONE: CPT

## 2021-09-20 PROCEDURE — 85025 COMPLETE CBC W/AUTO DIFF WBC: CPT

## 2021-09-20 PROCEDURE — 3074F SYST BP LT 130 MM HG: CPT | Performed by: FAMILY MEDICINE

## 2021-09-20 NOTE — PROGRESS NOTES
HPI:    Ashley Campos is a [address] old female presents to clinic for annual physical exam.  Has a rash on left arm, feels like it spreading. Occasionally itchy and flaky, also appears hypopigmented.   Also, last month patient was seen in the ER after suf WEEKS   Little interest or pleasure in doing things: Not at all    Feeling down, depressed, or hopeless: Not at all    PHQ-2 SCORE: 0           ROS:   Review of Systems   All other systems reviewed and are negative.       PHYSICAL EXAM:      09/20/21  9674 TO FREE T4,         LIPID PANEL  -Vaccines up-to-date, encouraged COVID-19 vaccine.  - Reinforced healthy diet, lifestyle, and exercise.   - Dentist visits Q6 months advised  - Annual eye exams advised  - Pap Smear,3 Years due on 03/12/2022    Follow up in

## 2022-03-28 RX ORDER — NORETHINDRONE ACETATE AND ETHINYL ESTRADIOL AND FERROUS FUMARATE 1.5-30(21)
KIT ORAL
Qty: 84 TABLET | Refills: 1 | OUTPATIENT
Start: 2022-03-28

## 2022-03-29 NOTE — TELEPHONE ENCOUNTER
Encounter Messages    Read Composed From To  Subject   Y 3/28/2022  6:06 PM Valdemar Carrillo RN [facility]  Pap smear is overdue

## 2022-04-12 ENCOUNTER — OFFICE VISIT (OUTPATIENT)
Dept: FAMILY MEDICINE CLINIC | Facility: CLINIC | Age: 32
End: 2022-04-12
Payer: COMMERCIAL

## 2022-04-12 ENCOUNTER — LAB ENCOUNTER (OUTPATIENT)
Dept: LAB | Age: 32
End: 2022-04-12
Attending: FAMILY MEDICINE
Payer: COMMERCIAL

## 2022-04-12 VITALS
WEIGHT: 230 LBS | BODY MASS INDEX: 40.75 KG/M2 | HEART RATE: 71 BPM | RESPIRATION RATE: 19 BRPM | OXYGEN SATURATION: 98 % | DIASTOLIC BLOOD PRESSURE: 89 MMHG | SYSTOLIC BLOOD PRESSURE: 137 MMHG | HEIGHT: 63 IN

## 2022-04-12 DIAGNOSIS — D50.9 IRON DEFICIENCY ANEMIA, UNSPECIFIED IRON DEFICIENCY ANEMIA TYPE: ICD-10-CM

## 2022-04-12 DIAGNOSIS — Z12.4 PAP SMEAR FOR CERVICAL CANCER SCREENING: Primary | ICD-10-CM

## 2022-04-12 DIAGNOSIS — R53.83 FATIGUE, UNSPECIFIED TYPE: ICD-10-CM

## 2022-04-12 LAB
BASOPHILS # BLD AUTO: 0.01 X10(3) UL (ref 0–0.2)
BASOPHILS NFR BLD AUTO: 0.2 %
CONTROL LINE PRESENT WITH A CLEAR BACKGROUND (YES/NO): YES YES/NO
DEPRECATED RDW RBC AUTO: 42.1 FL (ref 35.1–46.3)
EOSINOPHIL # BLD AUTO: 0.05 X10(3) UL (ref 0–0.7)
EOSINOPHIL NFR BLD AUTO: 0.9 %
ERYTHROCYTE [DISTWIDTH] IN BLOOD BY AUTOMATED COUNT: 12.5 % (ref 11–15)
HCT VFR BLD AUTO: 41.1 %
HGB BLD-MCNC: 13 G/DL
IMM GRANULOCYTES # BLD AUTO: 0.01 X10(3) UL (ref 0–1)
IMM GRANULOCYTES NFR BLD: 0.2 %
IRON SATN MFR SERPL: 28 %
IRON SERPL-MCNC: 105 UG/DL
LYMPHOCYTES # BLD AUTO: 2.87 X10(3) UL (ref 1–4)
LYMPHOCYTES NFR BLD AUTO: 51.4 %
MCH RBC QN AUTO: 29 PG (ref 26–34)
MCHC RBC AUTO-ENTMCNC: 31.6 G/DL (ref 31–37)
MCV RBC AUTO: 91.7 FL
MONOCYTES # BLD AUTO: 0.35 X10(3) UL (ref 0.1–1)
MONOCYTES NFR BLD AUTO: 6.3 %
NEUTROPHILS # BLD AUTO: 2.29 X10 (3) UL (ref 1.5–7.7)
NEUTROPHILS # BLD AUTO: 2.29 X10(3) UL (ref 1.5–7.7)
NEUTROPHILS NFR BLD AUTO: 41 %
PLATELET # BLD AUTO: 296 10(3)UL (ref 150–450)
PREGNANCY TEST, URINE: NEGATIVE
RBC # BLD AUTO: 4.48 X10(6)UL
TIBC SERPL-MCNC: 377 UG/DL (ref 240–450)
TRANSFERRIN SERPL-MCNC: 253 MG/DL (ref 200–360)
TSI SER-ACNC: 0.83 MIU/ML (ref 0.36–3.74)
WBC # BLD AUTO: 5.6 X10(3) UL (ref 4–11)

## 2022-04-12 PROCEDURE — 85025 COMPLETE CBC W/AUTO DIFF WBC: CPT

## 2022-04-12 PROCEDURE — 3008F BODY MASS INDEX DOCD: CPT | Performed by: FAMILY MEDICINE

## 2022-04-12 PROCEDURE — 3079F DIAST BP 80-89 MM HG: CPT | Performed by: FAMILY MEDICINE

## 2022-04-12 PROCEDURE — 81025 URINE PREGNANCY TEST: CPT | Performed by: FAMILY MEDICINE

## 2022-04-12 PROCEDURE — 83540 ASSAY OF IRON: CPT

## 2022-04-12 PROCEDURE — 99214 OFFICE O/P EST MOD 30 MIN: CPT | Performed by: FAMILY MEDICINE

## 2022-04-12 PROCEDURE — 3075F SYST BP GE 130 - 139MM HG: CPT | Performed by: FAMILY MEDICINE

## 2022-04-12 PROCEDURE — 36415 COLL VENOUS BLD VENIPUNCTURE: CPT

## 2022-04-12 PROCEDURE — 84466 ASSAY OF TRANSFERRIN: CPT

## 2022-04-12 PROCEDURE — 84443 ASSAY THYROID STIM HORMONE: CPT

## 2022-04-12 RX ORDER — NORETHINDRONE ACETATE AND ETHINYL ESTRADIOL 1.5-30(21)
1 KIT ORAL DAILY
Qty: 84 TABLET | Refills: 1 | Status: SHIPPED | OUTPATIENT
Start: 2022-04-12

## 2022-04-21 ENCOUNTER — HOSPITAL ENCOUNTER (OUTPATIENT)
Age: 32
Discharge: HOME | End: 2022-04-21
Payer: COMMERCIAL

## 2022-04-21 VITALS
WEIGHT: 230 LBS | OXYGEN SATURATION: 99 % | HEART RATE: 98 BPM | TEMPERATURE: 98 F | BODY MASS INDEX: 40.75 KG/M2 | HEIGHT: 63 IN | DIASTOLIC BLOOD PRESSURE: 90 MMHG | RESPIRATION RATE: 18 BRPM | SYSTOLIC BLOOD PRESSURE: 130 MMHG

## 2022-04-21 DIAGNOSIS — B35.4 TINEA CORPORIS: Primary | ICD-10-CM

## 2022-04-21 RX ORDER — KETOCONAZOLE 20 MG/G
1 CREAM TOPICAL DAILY
Qty: 60 G | Refills: 2 | Status: SHIPPED | OUTPATIENT
Start: 2022-04-21 | End: 2022-05-05

## 2022-04-21 NOTE — ED INITIAL ASSESSMENT (HPI)
Patient complaining of rash to chest and abdomen. States it started on her breast a few weeks ago, and is now spreading. Denies any itching or irritation from this rash.

## 2022-04-25 LAB — HPV I/H RISK 1 DNA SPEC QL NAA+PROBE: POSITIVE

## 2022-04-26 ENCOUNTER — TELEPHONE (OUTPATIENT)
Dept: FAMILY MEDICINE CLINIC | Facility: CLINIC | Age: 32
End: 2022-04-26

## 2022-04-26 LAB
HPV16 DNA CVX QL PROBE+SIG AMP: NEGATIVE
HPV18 DNA CVX QL PROBE+SIG AMP: NEGATIVE
LAST PAP RESULT: NORMAL

## 2022-04-26 NOTE — TELEPHONE ENCOUNTER
Got her HPV results back this morning but the cytology is still pending, need to wait for complete results.  Thanks

## 2022-04-26 NOTE — TELEPHONE ENCOUNTER
Patient calling, identified name and , calling about her test results and PAP smear    They were completed on   but not yet reviewed     Please advise and thank you.

## 2022-05-04 ENCOUNTER — OFFICE VISIT (OUTPATIENT)
Dept: FAMILY MEDICINE CLINIC | Facility: CLINIC | Age: 32
End: 2022-05-04
Payer: COMMERCIAL

## 2022-05-04 VITALS
HEIGHT: 63 IN | BODY MASS INDEX: 40.22 KG/M2 | TEMPERATURE: 98 F | DIASTOLIC BLOOD PRESSURE: 70 MMHG | HEART RATE: 96 BPM | OXYGEN SATURATION: 99 % | WEIGHT: 227 LBS | SYSTOLIC BLOOD PRESSURE: 124 MMHG

## 2022-05-04 DIAGNOSIS — S89.91XA INJURY OF RIGHT KNEE, INITIAL ENCOUNTER: ICD-10-CM

## 2022-05-04 DIAGNOSIS — L42 PITYRIASIS ROSEA: Primary | ICD-10-CM

## 2022-05-04 PROCEDURE — 3008F BODY MASS INDEX DOCD: CPT | Performed by: FAMILY MEDICINE

## 2022-05-04 PROCEDURE — 3078F DIAST BP <80 MM HG: CPT | Performed by: FAMILY MEDICINE

## 2022-05-04 PROCEDURE — 99214 OFFICE O/P EST MOD 30 MIN: CPT | Performed by: FAMILY MEDICINE

## 2022-05-04 PROCEDURE — 3074F SYST BP LT 130 MM HG: CPT | Performed by: FAMILY MEDICINE

## 2022-05-04 RX ORDER — FEXOFENADINE HCL 180 MG/1
180 TABLET ORAL DAILY
Qty: 60 TABLET | Refills: 1 | Status: SHIPPED | OUTPATIENT
Start: 2022-05-04

## 2022-05-05 ENCOUNTER — HOSPITAL ENCOUNTER (OUTPATIENT)
Dept: GENERAL RADIOLOGY | Age: 32
Discharge: HOME | End: 2022-05-05
Attending: FAMILY MEDICINE
Payer: COMMERCIAL

## 2022-05-05 DIAGNOSIS — S89.91XA INJURY OF RIGHT KNEE, INITIAL ENCOUNTER: ICD-10-CM

## 2022-05-05 PROCEDURE — 73562 X-RAY EXAM OF KNEE 3: CPT | Performed by: FAMILY MEDICINE

## 2022-06-06 ENCOUNTER — OFFICE VISIT (OUTPATIENT)
Dept: FAMILY MEDICINE CLINIC | Facility: CLINIC | Age: 32
End: 2022-06-06
Payer: COMMERCIAL

## 2022-06-06 VITALS
SYSTOLIC BLOOD PRESSURE: 120 MMHG | WEIGHT: 232 LBS | HEART RATE: 77 BPM | RESPIRATION RATE: 19 BRPM | OXYGEN SATURATION: 99 % | DIASTOLIC BLOOD PRESSURE: 87 MMHG | BODY MASS INDEX: 41 KG/M2

## 2022-06-06 DIAGNOSIS — L42 PITYRIASIS ROSEA: Primary | ICD-10-CM

## 2022-06-06 PROCEDURE — 3079F DIAST BP 80-89 MM HG: CPT | Performed by: FAMILY MEDICINE

## 2022-06-06 PROCEDURE — 3074F SYST BP LT 130 MM HG: CPT | Performed by: FAMILY MEDICINE

## 2022-06-06 PROCEDURE — 99213 OFFICE O/P EST LOW 20 MIN: CPT | Performed by: FAMILY MEDICINE

## 2022-06-17 ENCOUNTER — APPOINTMENT (OUTPATIENT)
Dept: URBAN - METROPOLITAN AREA CLINIC 244 | Age: 32
Setting detail: DERMATOLOGY
End: 2022-06-17

## 2022-06-17 DIAGNOSIS — L65.8 OTHER SPECIFIED NONSCARRING HAIR LOSS: ICD-10-CM

## 2022-06-17 DIAGNOSIS — L21.8 OTHER SEBORRHEIC DERMATITIS: ICD-10-CM

## 2022-06-17 DIAGNOSIS — B36.0 PITYRIASIS VERSICOLOR: ICD-10-CM

## 2022-06-17 PROCEDURE — OTHER COUNSELING: OTHER

## 2022-06-17 PROCEDURE — 99204 OFFICE O/P NEW MOD 45 MIN: CPT

## 2022-06-17 PROCEDURE — OTHER KOH PREP: OTHER

## 2022-06-17 PROCEDURE — OTHER PRESCRIPTION: OTHER

## 2022-06-17 RX ORDER — KETOCONAZOLE 20 MG/G
CREAM TOPICAL
Qty: 60 | Refills: 15 | Status: ERX | COMMUNITY
Start: 2022-06-17

## 2022-06-17 RX ORDER — KETOCONAZOLE 20 MG/ML
SHAMPOO, SUSPENSION TOPICAL BID
Qty: 120 | Refills: 15 | Status: ERX | COMMUNITY
Start: 2022-06-17

## 2022-06-17 RX ORDER — FLUOCINONIDE 0.5 MG/ML
SOLUTION TOPICAL
Qty: 60 | Refills: 8 | Status: ERX | COMMUNITY
Start: 2022-06-17

## 2022-06-17 RX ORDER — KETOCONAZOLE 10 MG/ML
SHAMPOO TOPICAL
Qty: 200 | Refills: 10 | Status: ERX | COMMUNITY
Start: 2022-06-17

## 2022-06-17 ASSESSMENT — LOCATION ZONE DERM
LOCATION ZONE: FACE
LOCATION ZONE: TRUNK
LOCATION ZONE: SCALP

## 2022-06-17 ASSESSMENT — LOCATION DETAILED DESCRIPTION DERM
LOCATION DETAILED: RIGHT INFERIOR MEDIAL MIDBACK
LOCATION DETAILED: SUPERIOR MID FOREHEAD
LOCATION DETAILED: EPIGASTRIC SKIN
LOCATION DETAILED: RIGHT INFERIOR UPPER BACK
LOCATION DETAILED: PERIUMBILICAL SKIN
LOCATION DETAILED: POSTERIOR MID-PARIETAL SCALP
LOCATION DETAILED: LEFT RIB CAGE

## 2022-06-17 ASSESSMENT — LOCATION SIMPLE DESCRIPTION DERM
LOCATION SIMPLE: RIGHT UPPER BACK
LOCATION SIMPLE: RIGHT LOWER BACK
LOCATION SIMPLE: ABDOMEN
LOCATION SIMPLE: POSTERIOR SCALP
LOCATION SIMPLE: SUPERIOR FOREHEAD

## 2022-06-17 NOTE — PROCEDURE: KOH PREP
Detail Level: Simple
Showing: spores with pseudohyphae
Koh Intro Text (From The.....): A KOH prep was ordered and evaluated from the
Koh Procedure Text (Tissue Harvesting Technique): A 15-blade scalpel was used to scrape the skin. The skin scrapings were placed on a glass slide, covered with a coverslip and a KOH solution was applied.
Cpt Desired:

## 2022-06-29 ENCOUNTER — MED REC SCAN ONLY (OUTPATIENT)
Dept: FAMILY MEDICINE CLINIC | Facility: CLINIC | Age: 32
End: 2022-06-29

## 2022-06-30 ENCOUNTER — APPOINTMENT (OUTPATIENT)
Dept: URBAN - METROPOLITAN AREA CLINIC 244 | Age: 32
Setting detail: DERMATOLOGY
End: 2022-07-06

## 2022-06-30 DIAGNOSIS — B36.0 PITYRIASIS VERSICOLOR: ICD-10-CM

## 2022-06-30 PROCEDURE — OTHER PRESCRIPTION MEDICATION MANAGEMENT: OTHER

## 2022-06-30 PROCEDURE — OTHER COUNSELING: OTHER

## 2022-06-30 PROCEDURE — 99213 OFFICE O/P EST LOW 20 MIN: CPT

## 2022-06-30 ASSESSMENT — LOCATION SIMPLE DESCRIPTION DERM
LOCATION SIMPLE: RIGHT UPPER BACK
LOCATION SIMPLE: RIGHT LOWER BACK
LOCATION SIMPLE: ABDOMEN

## 2022-06-30 ASSESSMENT — LOCATION DETAILED DESCRIPTION DERM
LOCATION DETAILED: RIGHT INFERIOR MEDIAL MIDBACK
LOCATION DETAILED: PERIUMBILICAL SKIN
LOCATION DETAILED: EPIGASTRIC SKIN
LOCATION DETAILED: RIGHT INFERIOR UPPER BACK

## 2022-06-30 ASSESSMENT — LOCATION ZONE DERM: LOCATION ZONE: TRUNK

## 2022-09-19 RX ORDER — NORETHINDRONE ACETATE AND ETHINYL ESTRADIOL 1.5-30(21)
1 KIT ORAL DAILY
Qty: 84 TABLET | Refills: 1 | Status: SHIPPED | OUTPATIENT
Start: 2022-09-19 | End: 2023-03-08

## 2022-09-19 NOTE — TELEPHONE ENCOUNTER
[facility].      Last PAP = 4/12/22    Requested Prescriptions   Pending Prescriptions Disp Refills    JUNEL FE 1.5/30 1.5-30 MG-MCG Oral Tab [Pharmacy Med Name: Ravinder Espinal 1.5/30 TABLETS 28S] 84 tablet 1     Sig: TAKE 1 TABLET BY MOUTH DAILY        Gynecology Medication Protocol Failed - 9/15/2022 10:38 PM        Failed - Pass dependent on manual look-up of last PAP and patient compliance with PAP follow up recommendations        Passed - In person appointment or virtual visit in the past 12 mos or appointment in next 3 mos       Recent Outpatient Visits              3 months ago [facility], Jj Strong, Chinyere Henry MD    Office Visit    4 months ago [facility] [address], Jj Strong, Chinyere Segundo MD    Office Visit    5 months ago Pap smear for cervical cancer screening    [facility], Jj Strong, Chinyere Henry MD    Office Visit    12 months ago Annual physical exam    [facility] Jj Strong, Minnie Barajas MD    Office Visit    1 year ago Preoperative examination    [facility], Jj Strong, Chinyere Henry MD    Office Visit                        Recent Outpatient Visits              3 months ago P[facility] [address], Soraida Sanz MD    Office Visit    4 months ago P[facility]asis rosea    [facility], Jj Strong, Chinyere Segundo MD    Office Visit    5 months ago Pap smear for cervical cancer screening    [facility], Jj Strong, Minnie Barajas MD    Office Visit    12 months ago Annual physical exam    [facility], Soraida Sanz MD    Office Visit    1 year ago Preoperative examination    [facility], Jj Strong, Chinyere Henry MD    Office Visit

## 2023-06-01 RX ORDER — NORETHINDRONE ACETATE AND ETHINYL ESTRADIOL AND FERROUS FUMARATE 1.5-30(21)
KIT ORAL
Qty: 84 TABLET | Refills: 0 | Status: SHIPPED | OUTPATIENT
Start: 2023-06-01

## 2023-08-03 ENCOUNTER — LAB ENCOUNTER (OUTPATIENT)
Dept: LAB | Facility: REFERENCE LAB | Age: 33
End: 2023-08-03
Attending: FAMILY MEDICINE
Payer: COMMERCIAL

## 2023-08-03 ENCOUNTER — OFFICE VISIT (OUTPATIENT)
Dept: FAMILY MEDICINE CLINIC | Facility: CLINIC | Age: 33
End: 2023-08-03

## 2023-08-03 VITALS
BODY MASS INDEX: 41.11 KG/M2 | OXYGEN SATURATION: 99 % | RESPIRATION RATE: 17 BRPM | SYSTOLIC BLOOD PRESSURE: 110 MMHG | HEART RATE: 70 BPM | WEIGHT: 232 LBS | DIASTOLIC BLOOD PRESSURE: 82 MMHG | HEIGHT: 63 IN

## 2023-08-03 DIAGNOSIS — R53.83 FATIGUE, UNSPECIFIED TYPE: ICD-10-CM

## 2023-08-03 DIAGNOSIS — R20.0 NUMBNESS IN FEET: ICD-10-CM

## 2023-08-03 DIAGNOSIS — R51.9 PERSISTENT HEADACHES: Primary | ICD-10-CM

## 2023-08-03 LAB
BASOPHILS # BLD AUTO: 0.01 X10(3) UL (ref 0–0.2)
BASOPHILS NFR BLD AUTO: 0.1 %
DEPRECATED RDW RBC AUTO: 38.8 FL (ref 35.1–46.3)
EOSINOPHIL # BLD AUTO: 0.03 X10(3) UL (ref 0–0.7)
EOSINOPHIL NFR BLD AUTO: 0.4 %
ERYTHROCYTE [DISTWIDTH] IN BLOOD BY AUTOMATED COUNT: 12.1 % (ref 11–15)
HCT VFR BLD AUTO: 38.6 %
HGB BLD-MCNC: 12.7 G/DL
IMM GRANULOCYTES # BLD AUTO: 0.01 X10(3) UL (ref 0–1)
IMM GRANULOCYTES NFR BLD: 0.1 %
IRON SATN MFR SERPL: 21 %
IRON SERPL-MCNC: 79 UG/DL
LYMPHOCYTES # BLD AUTO: 2.89 X10(3) UL (ref 1–4)
LYMPHOCYTES NFR BLD AUTO: 41.9 %
MCH RBC QN AUTO: 28.7 PG (ref 26–34)
MCHC RBC AUTO-ENTMCNC: 32.9 G/DL (ref 31–37)
MCV RBC AUTO: 87.3 FL
MONOCYTES # BLD AUTO: 0.38 X10(3) UL (ref 0.1–1)
MONOCYTES NFR BLD AUTO: 5.5 %
NEUTROPHILS # BLD AUTO: 3.58 X10 (3) UL (ref 1.5–7.7)
NEUTROPHILS # BLD AUTO: 3.58 X10(3) UL (ref 1.5–7.7)
NEUTROPHILS NFR BLD AUTO: 52 %
PLATELET # BLD AUTO: 339 10(3)UL (ref 150–450)
RBC # BLD AUTO: 4.42 X10(6)UL
TIBC SERPL-MCNC: 373 UG/DL (ref 240–450)
TRANSFERRIN SERPL-MCNC: 250 MG/DL (ref 200–360)
TSI SER-ACNC: 0.48 MIU/ML (ref 0.36–3.74)
VIT D+METAB SERPL-MCNC: 19.3 NG/ML (ref 30–100)
WBC # BLD AUTO: 6.9 X10(3) UL (ref 4–11)

## 2023-08-03 PROCEDURE — 82607 VITAMIN B-12: CPT

## 2023-08-03 PROCEDURE — 83540 ASSAY OF IRON: CPT

## 2023-08-03 PROCEDURE — 36415 COLL VENOUS BLD VENIPUNCTURE: CPT

## 2023-08-03 PROCEDURE — 3079F DIAST BP 80-89 MM HG: CPT | Performed by: FAMILY MEDICINE

## 2023-08-03 PROCEDURE — 3074F SYST BP LT 130 MM HG: CPT | Performed by: FAMILY MEDICINE

## 2023-08-03 PROCEDURE — 3008F BODY MASS INDEX DOCD: CPT | Performed by: FAMILY MEDICINE

## 2023-08-03 PROCEDURE — 82306 VITAMIN D 25 HYDROXY: CPT

## 2023-08-03 PROCEDURE — 84443 ASSAY THYROID STIM HORMONE: CPT

## 2023-08-03 PROCEDURE — 85025 COMPLETE CBC W/AUTO DIFF WBC: CPT

## 2023-08-03 PROCEDURE — 84466 ASSAY OF TRANSFERRIN: CPT

## 2023-08-03 PROCEDURE — 99214 OFFICE O/P EST MOD 30 MIN: CPT | Performed by: FAMILY MEDICINE

## 2023-08-03 NOTE — PROGRESS NOTES
HPI:    Pebbles Vargas is a 29year old female presents clinic with concerns regarding headaches/fatigue. Typically gets dull headaches during her cycle, noticed this 1 persisted after she finished bleeding. Bilateral, dull, persistent. Left work early on Monday, went home and took a nap. Next day, headache had resolved. Has not returned since then. Does report feeling fatigued constantly. Naps at work. Recently started taking diet pills that her mother gave her. Eats 1 meal a day, tries to drink a few glasses of water throughout the day. Denies visual changes, sensitivity to light, dizziness, syncopal episodes. Occasional numbness in feet, sometimes it feels tingly/cold. HISTORY:  Past Medical History:   Diagnosis Date    Visual impairment     glasses      No past surgical history on file. Family History   Problem Relation Age of Onset    Alcohol and Other Disorders Associated Father         alcoholism    Diabetes Father     Hypertension Mother     Other (Other) Mother         sarcodosis    Stroke Maternal Grandmother     Diabetes Paternal Grandmother     Hypertension Paternal Grandmother       Social History:   Social History     Socioeconomic History    Marital status: Single   Tobacco Use    Smoking status: Never    Smokeless tobacco: Current   Vaping Use    Vaping Use: Never used   Substance and Sexual Activity    Alcohol use: Yes     Comment: weekly    Drug use: Not Currently        Medications (Active prior to today's visit):  Current Outpatient Medications   Medication Sig Dispense Refill    JUNEL FE 1.5/30 1.5-30 MG-MCG Oral Tab TAKE 1 TABLET BY MOUTH DAILY 84 tablet 0    fexofenadine 180 MG Oral Tab Take 1 tablet (180 mg total) by mouth daily.  (Patient not taking: Reported on 6/6/2022) 60 tablet 1       Allergies:  No Known Allergies      Depression Screening (PHQ-2/PHQ-9): Over the LAST 2 WEEKS   Little interest or pleasure in doing things: Not at all    Feeling down, depressed, or hopeless: Not at all    PHQ-2 SCORE: 0           ROS:   Review of Systems   All other systems reviewed and are negative. PHYSICAL EXAM:      08/03/23  1338   BP: 110/82   BP Location: Right arm   Patient Position: Sitting   Cuff Size: adult   Pulse: 70   Resp: 17   SpO2: 99%   Weight: 232 lb (105.2 kg)   Height: 5' 3\" (1.6 m)     Physical Exam  Vitals reviewed. Constitutional:       General: She is not in acute distress. HENT:      Nose: Nose normal.      Mouth/Throat:      Mouth: Mucous membranes are moist.   Cardiovascular:      Rate and Rhythm: Normal rate and regular rhythm. Heart sounds: Normal heart sounds. Pulmonary:      Effort: Pulmonary effort is normal. No respiratory distress. Breath sounds: Normal breath sounds. Neurological:      Mental Status: She is alert. Mental status is at baseline. ASSESSMENT/PLAN:   (R51.9) Persistent headaches  (primary encounter diagnosis)  (R53.83) Fatigue, unspecified type  (R20.0) Numbness in feet  Plan: CBC WITH DIFFERENTIAL WITH PLATELET, IRON AND         TIBC, TSH W REFLEX TO FREE T4, VITAMIN D,         VITAMIN B12  -Headaches have resolved. Instructed to stop taking diet pills. To eat small, frequent meals throughout the day. Increase hydration with water-only 64 ounces per day. Baseline labs drawn today to assess for other causes of fatigue. If normal, can consider sleep study. Also can consider podiatry referral for numbness in feet. Patient agreeable to plan         Responsible party/patient verbalized understanding of information discussed. No barriers to learning observed.           Orders This Visit:  Orders Placed This Encounter      CBC W Differential W Platelet [E]      Iron And Tibc [E]      TSH W Reflex To Free T4 [E]      Vitamin D [E]      Vitamin B12 [E]      Meds This Visit:  Requested Prescriptions      No prescriptions requested or ordered in this encounter       Imaging & Referrals:  None       The 21st Century cures Act makes medical notes like these available to patients in the interest of transparency. However, be advised that this is a medical document. It is intended as peer to peer communication. It is written in medical language and may contain abbreviations or verbiage that are unfamiliar. It may appear blunt or direct. Medical documents are intended to carry relevant information, facts as evident, and the clinical opinion of the practitioner. This note was created by [employer] voice recognition. Errors in content may be related to improper recognition by the system; efforts to review and correct have been done but errors may still exist. Please contact me with any questions.        8/3/2023  Yoel Douglas MD

## 2023-08-04 LAB — VIT B12 SERPL-MCNC: 302 PG/ML (ref 193–986)

## 2023-08-09 RX ORDER — NORETHINDRONE ACETATE AND ETHINYL ESTRADIOL 1.5-30(21)
1 KIT ORAL DAILY
Qty: 84 TABLET | Refills: 0 | Status: SHIPPED | OUTPATIENT
Start: 2023-08-09

## 2023-08-09 NOTE — TELEPHONE ENCOUNTER
Please review; protocol failed. Last pap: 4/12/22. Per result note, patient to repeat pap in 1 year d/t positive HPV results.   LR-06/2023  Requested Prescriptions   Pending Prescriptions Disp Refills    JUNEL FE 1.5/30 1.5-30 MG-MCG Oral Tab [Pharmacy Med Name: Gisela Lala 1.5/30 TABLETS 28S] 84 tablet 0     Sig: TAKE 1 TABLET BY MOUTH DAILY       Gynecology Medication Protocol Failed - 8/7/2023  7:06 PM        Failed - Pass dependent on manual look-up of last PAP and patient compliance with PAP follow up recommendations        Passed - In person appointment or virtual visit in the past 12 mos or appointment in next 3 mos     Recent Outpatient Visits              5 days ago Persistent headaches    Jonathan Vergara MD    Office Visit    1 year ago [facility] Isabella Hung MD    Office Visit    1 year ago [facility] Tawana Reese, Deena Richter MD    Office Visit    1 year ago Pap smear for cervical cancer screening    [address], Rekha Costello MD    Office Visit    1 year ago Annual physical exam    Jonathan Vergara MD    Office Visit

## 2023-10-30 RX ORDER — ERGOCALCIFEROL 1.25 MG/1
50000 CAPSULE ORAL WEEKLY
Qty: 12 CAPSULE | Refills: 0 | Status: SHIPPED | OUTPATIENT
Start: 2023-10-30

## 2023-10-30 RX ORDER — NORETHINDRONE ACETATE AND ETHINYL ESTRADIOL 1.5-30(21)
1 KIT ORAL DAILY
Qty: 84 TABLET | Refills: 0 | Status: SHIPPED | OUTPATIENT
Start: 2023-10-30

## 2023-10-30 NOTE — TELEPHONE ENCOUNTER
Please review; protocol failed. No repeatPAP noted   Hi Wilma, your Pap smear results were positive for HPV. Your last Pap in 2019 was normal.  I would like to repeat this in 1 year, instead of waiting for 3. Make sure you use condoms every single time you have sex to prevent yourself from joanna this virus again, spreading it to others.   Let me know if you have any questions-Dr. Valdovinos Read   Written by Nancy Vanegas MD on 4/26/2022  5:24 PM CDT  Seen by patient Eddie Horne on 4/26/2022  5:25 PM      Requested Prescriptions   Pending Prescriptions Disp Refills    ERGOCALCIFEROL 1.25 MG (93777 UT) Oral Cap [Pharmacy Med Name: VITAMIN D2 50,000IU (ERGO) CAP RX] 12 capsule 0     Sig: TAKE 1 CAPSULE BY MOUTH 1 TIME A WEEK       There is no refill protocol information for this order       Nuno Gretta FE 1.5/30 1.5-30 MG-MCG Oral Tab [Pharmacy Med Name: Hair Gilbert 1.5/30 TABLETS 28S] 84 tablet 0     Sig: TAKE 1 TABLET BY MOUTH DAILY       Gynecology Medication Protocol Failed - 10/28/2023 12:34 PM        Failed - Pass dependent on manual look-up of last PAP and patient compliance with PAP follow up recommendations        Passed - In person appointment or virtual visit in the past 12 mos or appointment in next 3 mos     Recent Outpatient Visits              2 months ago Persistent headaches    Samira Lawton, Emilia Butcher MD    Office Visit    1 year ago Pityriasis Maida High MD    Office Visit    1 year ago Pityriasis Zoe Sheppard MD    Office Visit    1 year ago Pap smear for cervical cancer screening    Emily Lino, Sahara Ramos MD    Office Visit    2 years ago Annual physical exam    Samira Lawton [address], Sahara Ramos MD    Office Visit          Future Appointments Provider Department Appt Notes    Tomorrow Alex Mello MD [facility], [address], [address] I see something my private area. Future Appointments         Provider Department Appt Notes    Tomorrow Alex Mello MD [address] [facility], [address] I see something my private area.           Recent Outpatient Visits              2 months ago Persistent headaches    Isma Oakley MD    Office Visit    1 year ago Pityriasis Roseanne Qureshi MD    Office Visit    1 year ago Pityriasis Stanley Martinez MD    Office Visit    1 year ago Pap smear for cervical cancer screening    Isma Oakley MD    Office Visit    2 years ago Annual physical exam    [address], [address], [address] Alex Mello MD    Office Visit

## 2023-10-31 ENCOUNTER — LAB ENCOUNTER (OUTPATIENT)
Dept: LAB | Age: 33
End: 2023-10-31
Attending: FAMILY MEDICINE
Payer: COMMERCIAL

## 2023-10-31 ENCOUNTER — OFFICE VISIT (OUTPATIENT)
Dept: FAMILY MEDICINE CLINIC | Facility: CLINIC | Age: 33
End: 2023-10-31

## 2023-10-31 VITALS
TEMPERATURE: 98 F | OXYGEN SATURATION: 99 % | WEIGHT: 221 LBS | HEART RATE: 84 BPM | DIASTOLIC BLOOD PRESSURE: 88 MMHG | BODY MASS INDEX: 39.16 KG/M2 | HEIGHT: 63 IN | SYSTOLIC BLOOD PRESSURE: 130 MMHG

## 2023-10-31 DIAGNOSIS — E55.9 VITAMIN D DEFICIENCY: ICD-10-CM

## 2023-10-31 DIAGNOSIS — R42 DIZZY SPELLS: ICD-10-CM

## 2023-10-31 DIAGNOSIS — E55.9 VITAMIN D DEFICIENCY: Primary | ICD-10-CM

## 2023-10-31 DIAGNOSIS — N89.8 VAGINAL CYST: ICD-10-CM

## 2023-10-31 LAB
CONTROL LINE PRESENT WITH A CLEAR BACKGROUND (YES/NO): YES YES/NO
EST. AVERAGE GLUCOSE BLD GHB EST-MCNC: 97 MG/DL (ref 68–126)
HBA1C MFR BLD: 5 % (ref ?–5.7)
KIT LOT #: NORMAL NUMERIC
PREGNANCY TEST, URINE: NEGATIVE
VIT D+METAB SERPL-MCNC: 70.4 NG/ML (ref 30–100)

## 2023-10-31 PROCEDURE — 81025 URINE PREGNANCY TEST: CPT | Performed by: FAMILY MEDICINE

## 2023-10-31 PROCEDURE — 36415 COLL VENOUS BLD VENIPUNCTURE: CPT

## 2023-10-31 PROCEDURE — 99214 OFFICE O/P EST MOD 30 MIN: CPT | Performed by: FAMILY MEDICINE

## 2023-10-31 PROCEDURE — 83036 HEMOGLOBIN GLYCOSYLATED A1C: CPT

## 2023-10-31 PROCEDURE — 82306 VITAMIN D 25 HYDROXY: CPT

## 2023-10-31 PROCEDURE — 3075F SYST BP GE 130 - 139MM HG: CPT | Performed by: FAMILY MEDICINE

## 2023-10-31 PROCEDURE — 3008F BODY MASS INDEX DOCD: CPT | Performed by: FAMILY MEDICINE

## 2023-10-31 PROCEDURE — 3079F DIAST BP 80-89 MM HG: CPT | Performed by: FAMILY MEDICINE

## 2024-01-16 RX ORDER — NORETHINDRONE ACETATE AND ETHINYL ESTRADIOL 1.5-30(21)
1 KIT ORAL DAILY
Qty: 28 TABLET | Refills: 0 | Status: SHIPPED | OUTPATIENT
Start: 2024-01-16

## 2024-01-16 RX ORDER — NORETHINDRONE ACETATE AND ETHINYL ESTRADIOL 1.5-30(21)
1 KIT ORAL DAILY
Qty: 84 TABLET | Refills: 0 | OUTPATIENT
Start: 2024-01-16

## 2024-01-16 NOTE — TELEPHONE ENCOUNTER
Please review; protocol failed/ No protocol       Pap not yet repeated     Hi Wilma, your Pap smear results were positive for HPV.  Your last Pap in 2019 was normal.  I would like to repeat this in 1 year, instead of waiting for 3.  Make sure you use condoms every single time you have sex to prevent yourself from joanna this virus again, spreading it to others.  Let me know if you have any questions-Dr. Barber   Written by Shant Barber MD on 4/26/2022  5:24 PM CDT  Seen by patient Wilma Reagan on 4/26/2022  5:25 PM      Requested Prescriptions   Pending Prescriptions Disp Refills    Norethin Ace-Eth Estrad-FE (JUNEL FE 1.5/30) 1.5-30 MG-MCG Oral Tab 84 tablet 0     Sig: Take 1 tablet by mouth daily.       Gynecology Medication Protocol Failed - 1/14/2024 10:55 PM        Failed - Pass dependent on manual look-up of last PAP and patient compliance with PAP follow up recommendations        Passed - In person appointment or virtual visit in the past 12 mos or appointment in next 3 mos     Recent Outpatient Visits              2 months ago Vitamin D deficiency    [facility] Shant Barber MD    Office Visit    5 months ago Persistent headaches    [facility] Shant Barber MD    Office Visit    1 year ago Pityriasis rosea    [facility] Shant Barber MD    Office Visit    1 year ago Pityriasis rosea    [facility] Lissette Martínez MD    Office Visit    1 year ago Pap smear for cervical cancer screening    [facility] Shant Barber MD    Office Visit                           Recent Outpatient Visits              2 months ago Vitamin D deficiency    [facility] Shant Barber MD    Office Visit    5 months ago Persistent headaches    [facility] Shant Barber MD    Office Visit    1 year ago [facility] Shant Barber MD    Office Visit    1 year ago [facility] Lissette Martínez MD    Office Visit    1 year ago Pap smear for cervical cancer screening    [facility] Shant Barber MD    Office Visit

## 2024-01-16 NOTE — TELEPHONE ENCOUNTER
I refill given until pap. Please have patient schedule appointment for annual with pap smear - AMANDA Jones

## 2024-01-22 RX ORDER — NORETHINDRONE ACETATE AND ETHINYL ESTRADIOL AND FERROUS FUMARATE 1.5-30(21)
1 KIT ORAL DAILY
Qty: 84 TABLET | Refills: 0 | OUTPATIENT
Start: 2024-01-22

## 2024-02-01 RX ORDER — ERGOCALCIFEROL 1.25 MG/1
50000 CAPSULE ORAL WEEKLY
Qty: 12 CAPSULE | Refills: 0 | OUTPATIENT
Start: 2024-02-01

## 2024-02-01 NOTE — TELEPHONE ENCOUNTER
Please review. Protocol failed / No Protocol.    Result 10/23      Ruddy Dillon-your hemoglobin A1c test is negative-this means you are not prediabetic/diabetic.  Your vitamin D levels are excellent-please stop taking any additional supplementation.  Please let me know if you have any questions. - Dr. Barber    Requested Prescriptions   Pending Prescriptions Disp Refills    ERGOCALCIFEROL 1.25 MG (33311 UT) Oral Cap [Pharmacy Med Name: VITAMIN D2 50,000IU (ERGO) CAP RX] 12 capsule 0     Sig: TAKE 1 CAPSULE BY MOUTH 1 TIME A WEEK       There is no refill protocol information for this order

## 2024-02-02 ENCOUNTER — OFFICE VISIT (OUTPATIENT)
Dept: FAMILY MEDICINE CLINIC | Facility: CLINIC | Age: 34
End: 2024-02-02
Payer: COMMERCIAL

## 2024-02-02 ENCOUNTER — LAB ENCOUNTER (OUTPATIENT)
Dept: LAB | Age: 34
End: 2024-02-02
Attending: FAMILY MEDICINE
Payer: COMMERCIAL

## 2024-02-02 VITALS
DIASTOLIC BLOOD PRESSURE: 87 MMHG | BODY MASS INDEX: 40.22 KG/M2 | HEIGHT: 63 IN | WEIGHT: 227 LBS | OXYGEN SATURATION: 98 % | HEART RATE: 72 BPM | SYSTOLIC BLOOD PRESSURE: 125 MMHG | RESPIRATION RATE: 18 BRPM

## 2024-02-02 DIAGNOSIS — Z00.00 ANNUAL PHYSICAL EXAM: Primary | ICD-10-CM

## 2024-02-02 DIAGNOSIS — R87.810 ASCUS WITH POSITIVE HIGH RISK HPV CERVICAL: ICD-10-CM

## 2024-02-02 DIAGNOSIS — Z12.4 PAP SMEAR FOR CERVICAL CANCER SCREENING: ICD-10-CM

## 2024-02-02 DIAGNOSIS — Z00.00 ANNUAL PHYSICAL EXAM: ICD-10-CM

## 2024-02-02 DIAGNOSIS — R87.610 ASCUS WITH POSITIVE HIGH RISK HPV CERVICAL: ICD-10-CM

## 2024-02-02 LAB
ALBUMIN SERPL-MCNC: 4.1 G/DL (ref 3.2–4.8)
ALBUMIN/GLOB SERPL: 1.4 {RATIO} (ref 1–2)
ALP LIVER SERPL-CCNC: 41 U/L
ALT SERPL-CCNC: <7 U/L
ANION GAP SERPL CALC-SCNC: 7 MMOL/L (ref 0–18)
AST SERPL-CCNC: 16 U/L (ref ?–34)
BILIRUB SERPL-MCNC: 0.3 MG/DL (ref 0.3–1.2)
BUN BLD-MCNC: 10 MG/DL (ref 9–23)
BUN/CREAT SERPL: 12.8 (ref 10–20)
CALCIUM BLD-MCNC: 8.8 MG/DL (ref 8.7–10.4)
CHLORIDE SERPL-SCNC: 107 MMOL/L (ref 98–112)
CHOLEST SERPL-MCNC: 116 MG/DL (ref ?–200)
CO2 SERPL-SCNC: 28 MMOL/L (ref 21–32)
CREAT BLD-MCNC: 0.78 MG/DL
EGFRCR SERPLBLD CKD-EPI 2021: 103 ML/MIN/1.73M2 (ref 60–?)
FASTING PATIENT LIPID ANSWER: NO
FASTING STATUS PATIENT QL REPORTED: NO
GLOBULIN PLAS-MCNC: 2.9 G/DL (ref 2.8–4.4)
GLUCOSE BLD-MCNC: 67 MG/DL (ref 70–99)
HDLC SERPL-MCNC: 54 MG/DL (ref 40–59)
LDLC SERPL CALC-MCNC: 49 MG/DL (ref ?–100)
NONHDLC SERPL-MCNC: 62 MG/DL (ref ?–130)
OSMOLALITY SERPL CALC.SUM OF ELEC: 291 MOSM/KG (ref 275–295)
POTASSIUM SERPL-SCNC: 3.6 MMOL/L (ref 3.5–5.1)
PROT SERPL-MCNC: 7 G/DL (ref 5.7–8.2)
SODIUM SERPL-SCNC: 142 MMOL/L (ref 136–145)
TRIGL SERPL-MCNC: 56 MG/DL (ref 30–149)
TSI SER-ACNC: 0.69 MIU/ML (ref 0.55–4.78)
VLDLC SERPL CALC-MCNC: 8 MG/DL (ref 0–30)

## 2024-02-02 PROCEDURE — 99395 PREV VISIT EST AGE 18-39: CPT | Performed by: FAMILY MEDICINE

## 2024-02-02 PROCEDURE — 84443 ASSAY THYROID STIM HORMONE: CPT

## 2024-02-02 PROCEDURE — 3008F BODY MASS INDEX DOCD: CPT | Performed by: FAMILY MEDICINE

## 2024-02-02 PROCEDURE — 3074F SYST BP LT 130 MM HG: CPT | Performed by: FAMILY MEDICINE

## 2024-02-02 PROCEDURE — 36415 COLL VENOUS BLD VENIPUNCTURE: CPT

## 2024-02-02 PROCEDURE — 3079F DIAST BP 80-89 MM HG: CPT | Performed by: FAMILY MEDICINE

## 2024-02-02 PROCEDURE — 80053 COMPREHEN METABOLIC PANEL: CPT

## 2024-02-02 PROCEDURE — 80061 LIPID PANEL: CPT

## 2024-02-02 RX ORDER — NORETHINDRONE ACETATE AND ETHINYL ESTRADIOL 1.5-30(21)
1 KIT ORAL DAILY
Qty: 84 TABLET | Refills: 3 | Status: SHIPPED | OUTPATIENT
Start: 2024-02-02

## 2024-02-02 NOTE — H&P
HPI:    Wilma Reagan is a 33 year old female presents to clinic for annual physical exam.  No acute concerns.  Has noticed some irregular menstrual bleeding.  Has had 2 periods this month.  Sometimes misses doses of her birth control pill. Patient's last menstrual period was 01/30/2024 (exact date).  Normal appetite, tries eat healthy food.  Does not exercise, plans to start.  Normal bowel movements and urination.  Normal sleep habits.      HISTORY:  Past Medical History:   Diagnosis Date    Visual impairment     glasses      No past surgical history on file.   Family History   Problem Relation Age of Onset    Alcohol and Other Disorders Associated Father         alcoholism    Diabetes Father     Hypertension Mother     Other (Other) Mother         sarcodosis    Stroke Maternal Grandmother     Diabetes Paternal Grandmother     Hypertension Paternal Grandmother       Social History:   Social History     Socioeconomic History    Marital status: Single   Tobacco Use    Smoking status: Never    Smokeless tobacco: Current   Vaping Use    Vaping Use: Never used   Substance and Sexual Activity    Alcohol use: Yes     Comment: weekly    Drug use: Not Currently        Medications (Active prior to today's visit):  Current Outpatient Medications   Medication Sig Dispense Refill    Norethin Ace-Eth Estrad-FE (JUNEL FE 1.5/30) 1.5-30 MG-MCG Oral Tab Take 1 tablet by mouth daily. 84 tablet 3       Allergies:  No Known Allergies      Depression Screening (PHQ-2/PHQ-9): Over the LAST 2 WEEKS   Little interest or pleasure in doing things: Not at all    Feeling down, depressed, or hopeless: Not at all    PHQ-2 SCORE: 0           ROS:   Review of Systems   All other systems reviewed and are negative.      PHYSICAL EXAM:     Vitals:    02/02/24 0831   BP: 125/87   BP Location: Right arm   Patient Position: Sitting   Cuff Size: large   Pulse: 72   Resp: 18   SpO2: 98%   Weight: 227 lb (103 kg)   Height: 5' 3\" (1.6 m)     Physical  Exam  Vitals reviewed.   Constitutional:       General: She is not in acute distress.  HENT:      Head: Normocephalic and atraumatic.      Right Ear: Tympanic membrane, ear canal and external ear normal.      Left Ear: Tympanic membrane, ear canal and external ear normal.      Nose: Nose normal.      Mouth/Throat:      Pharynx: Uvula midline.   Eyes:      Conjunctiva/sclera: Conjunctivae normal.      Pupils: Pupils are equal, round, and reactive to light.   Neck:      Thyroid: No thyromegaly.   Cardiovascular:      Rate and Rhythm: Normal rate and regular rhythm.      Heart sounds: Normal heart sounds. No murmur heard.  Pulmonary:      Effort: Pulmonary effort is normal. No respiratory distress.      Breath sounds: Normal breath sounds. No wheezing or rales.   Chest:   Breasts:     Right: Normal. No swelling, bleeding, inverted nipple, mass, nipple discharge, skin change or tenderness.      Left: Normal. No swelling, bleeding, inverted nipple, mass, nipple discharge, skin change or tenderness.   Abdominal:      General: Bowel sounds are normal. There is no distension.      Palpations: Abdomen is soft.      Tenderness: There is no abdominal tenderness. There is no guarding or rebound.   Musculoskeletal:      Cervical back: Normal range of motion and neck supple.   Lymphadenopathy:      Cervical: No cervical adenopathy.   Neurological:      Mental Status: She is alert.         ASSESSMENT/PLAN:   (Z00.00) Annual physical exam  (primary encounter diagnosis)  Plan: Lipid Panel [E], Comp Metabolic Panel (14) [E],        TSH W Reflex To Free T4 [E]  - Immunizations UTD   - Reinforced healthy diet, lifestyle, and exercise.  - Past Medical/Social/Family histories reviewed  - Regular dental visits recommended   - Regular eye exams recommended       Follow up in 1 year or sooner if needed      (Z12.4) Pap smear for cervical cancer screening  (R87.610,  R87.810) ASCUS with positive high risk HPV cervical  Plan: ThinPrep PAP  with HPV Reflex Request [E],   -Abnormal in 2023, repeated today.             Responsible party/patient verbalized understanding of information discussed. No barriers to learning observed.          Orders This Visit:  Orders Placed This Encounter   Procedures    Lipid Panel [E]    Comp Metabolic Panel (14) [E]    TSH W Reflex To Free T4 [E]    ThinPrep PAP with HPV Reflex Request [E]       Meds This Visit:  Requested Prescriptions     Signed Prescriptions Disp Refills    Norethin Ace-Eth Estrad-FE (JUNEL FE 1.5/30) 1.5-30 MG-MCG Oral Tab 84 tablet 3     Sig: Take 1 tablet by mouth daily.       Imaging & Referrals:  None       The 21st Century cures Act makes medical notes like these available to patients in the interest of transparency.  However, be advised that this is a medical document.  It is intended as peer to peer communication.  It is written in medical language and may contain abbreviations or verbiage that are unfamiliar.  It may appear blunt or direct.  Medical documents are intended to carry relevant information, facts as evident, and the clinical opinion of the practitioner.      This note was created by [employer] voice recognition. Errors in content may be related to improper recognition by the system; efforts to review and correct have been done but errors may still exist. Please contact me with any questions.       2/2/2024  Shant Barber MD

## 2024-02-05 LAB
C TRACH DNA SPEC QL NAA+PROBE: NEGATIVE
N GONORRHOEA DNA SPEC QL NAA+PROBE: NEGATIVE

## 2024-02-12 LAB — HPV I/H RISK 1 DNA SPEC QL NAA+PROBE: NEGATIVE

## 2024-05-08 ENCOUNTER — PATIENT MESSAGE (OUTPATIENT)
Dept: FAMILY MEDICINE CLINIC | Facility: CLINIC | Age: 34
End: 2024-05-08

## 2024-05-09 NOTE — TELEPHONE ENCOUNTER
From: Wilma Reagan  To: Shant Barber  Sent: 5/8/2024 5:50 PM CDT  Subject: Medical injections     I would like to know will I be able to use the wegovy to help me lose weight.

## 2024-05-21 ENCOUNTER — OFFICE VISIT (OUTPATIENT)
Dept: FAMILY MEDICINE CLINIC | Facility: CLINIC | Age: 34
End: 2024-05-21

## 2024-05-21 VITALS
OXYGEN SATURATION: 98 % | SYSTOLIC BLOOD PRESSURE: 138 MMHG | RESPIRATION RATE: 16 BRPM | HEART RATE: 71 BPM | WEIGHT: 228 LBS | BODY MASS INDEX: 40.4 KG/M2 | DIASTOLIC BLOOD PRESSURE: 80 MMHG | HEIGHT: 63 IN

## 2024-05-21 DIAGNOSIS — R63.5 WEIGHT GAIN: ICD-10-CM

## 2024-05-21 DIAGNOSIS — Z30.41 ENCOUNTER FOR SURVEILLANCE OF CONTRACEPTIVE PILLS: ICD-10-CM

## 2024-05-21 DIAGNOSIS — M25.562 LEFT KNEE PAIN, UNSPECIFIED CHRONICITY: ICD-10-CM

## 2024-05-21 PROCEDURE — 3008F BODY MASS INDEX DOCD: CPT | Performed by: FAMILY MEDICINE

## 2024-05-21 PROCEDURE — 99214 OFFICE O/P EST MOD 30 MIN: CPT | Performed by: FAMILY MEDICINE

## 2024-05-21 PROCEDURE — 3075F SYST BP GE 130 - 139MM HG: CPT | Performed by: FAMILY MEDICINE

## 2024-05-21 PROCEDURE — 3079F DIAST BP 80-89 MM HG: CPT | Performed by: FAMILY MEDICINE

## 2024-05-21 RX ORDER — NORETHINDRONE ACETATE AND ETHINYL ESTRADIOL 1.5-30(21)
1 KIT ORAL DAILY
Qty: 84 TABLET | Refills: 3 | Status: SHIPPED | OUTPATIENT
Start: 2024-05-21

## 2024-05-21 NOTE — PROGRESS NOTES
HPI:    Wilma Reagan is a 34 year old female presents to clinic with multiple concerns.  She is concerned about her weight.  Admits to poor diet, usually eats restaurant foods.  About 2 meals a day.  She does not exercise.  Sleeps about 5 to 6 hours per night.  Had cosmetic surgery in 2020, since then she has been unable to keep weight off.  Interested in pharmacological treatment for weight loss.  Additionally, since March patient has been experiencing left-sided knee pain.  Feels she might of injured her knee while dancing.  Since then has pain with certain movements.  Denies swelling.  Able to walk on even ground, has some pain when she flexes/extends knee.      HISTORY:  Past Medical History:    Visual impairment    glasses      No past surgical history on file.   Family History   Problem Relation Age of Onset    Alcohol and Other Disorders Associated Father         alcoholism    Diabetes Father     Hypertension Mother     Other (Other) Mother         sarcodosis    Stroke Maternal Grandmother     Diabetes Paternal Grandmother     Hypertension Paternal Grandmother       Social History:   Social History     Socioeconomic History    Marital status: Single   Tobacco Use    Smoking status: Never    Smokeless tobacco: Current   Vaping Use    Vaping status: Never Used   Substance and Sexual Activity    Alcohol use: Yes     Comment: weekly    Drug use: Not Currently        Medications (Active prior to today's visit):  Current Outpatient Medications   Medication Sig Dispense Refill    Norethin Ace-Eth Estrad-FE (JUNEL FE 1.5/30) 1.5-30 MG-MCG Oral Tab Take 1 tablet by mouth daily. 84 tablet 3       Allergies:  No Known Allergies      Depression Screening (PHQ-2/PHQ-9): Over the LAST 2 WEEKS                         ROS:   Review of Systems   All other systems reviewed and are negative.      PHYSICAL EXAM:     Vitals:    05/21/24 0945   BP: 138/80   BP Location: Right arm   Patient Position: Sitting   Cuff Size: large    Pulse: 71   Resp: 16   SpO2: 98%   Weight: 228 lb (103.4 kg)   Height: 5' 3\" (1.6 m)     Physical Exam  Vitals reviewed.   Constitutional:       General: She is not in acute distress.  Cardiovascular:      Rate and Rhythm: Normal rate.   Pulmonary:      Effort: Pulmonary effort is normal. No respiratory distress.   Musculoskeletal:      Comments: Left knee - No swelling or point tenderness, no visible deformities, Ant/Post Drawer test - neg, Lachman Test - neg, ROM - WNL     Neurological:      Mental Status: She is alert.         ASSESSMENT/PLAN:   (Z68.41) BMI 40.0-44.9, adult (HCC)  (primary encounter diagnosis)  (R63.5) Weight gain  Plan: [facility] - Dr. Adrien Cullen [facility]  -Lifestyle modifications discussed with patient.  Referred to bariatrics for further management      (Z30.41) Encounter for surveillance of contraceptive pills  Plan:   -Takes OCPs, denies side effects.  Prescription refilled.  Safe sexual practices encouraged    (M25.562) Left knee pain, unspecified chronicity  Plan:   -Unremarkable exam of knee.  Supportive care measures discussed-stretching, bracing, regular physical activity.  Follow-up in 4 to 6 weeks if symptoms do not improve.               Responsible party/patient verbalized understanding of information discussed. No barriers to learning observed.            Orders This Visit:  No orders of the defined types were placed in this encounter.      Meds This Visit:  Requested Prescriptions     Signed Prescriptions Disp Refills    Norethin Ace-Eth Estrad-FE (JUNEL FE 1.5/30) 1.5-30 MG-MCG Oral Tab 84 tablet 3     Sig: Take 1 tablet by mouth daily.       Imaging & Referrals:  BARIATRICS - INTERNAL     Chaperone offered at visit today.     The 21st Century cures Act makes medical notes like these available to patients in the interest of transparency.  However, be advised that this is a medical document.  It is intended as peer to peer  communication.  It is written in medical language and may contain abbreviations or verbiage that are unfamiliar.  It may appear blunt or direct.  Medical documents are intended to carry relevant information, facts as evident, and the clinical opinion of the practitioner.      This note was created by [employer] voice recognition. Errors in content may be related to improper recognition by the system; efforts to review and correct have been done but errors may still exist. Please contact me with any questions.       5/21/2024  Shant Barber MD

## 2024-05-21 NOTE — PATIENT INSTRUCTIONS
Lifestyle Modifications for Weight Loss:  Whole Food/Plant Strong/Low Glycemic Index diet, moderate alcohol consumption, reduced sodium intake to no more than 2,400 mg/day, and at least 150 minutes of moderate physical activity per week.   Avoid processed, poor quality carbohydrates, refined grains, flour, sugar.     Goals for next month:  1. Keep a food log.  2. Drink 64 ounces of non-caloric beverages per day. No fruit juices or regular soda.  3. Aim for 150 minutes moderate exercise per week.    4. Increase fruit and vegetable servings to 5-6 per day.    5. Improve sleep and stress.      non-distended

## 2024-08-01 ENCOUNTER — HOSPITAL ENCOUNTER (OUTPATIENT)
Age: 34
Discharge: HOME | End: 2024-08-01
Payer: COMMERCIAL

## 2024-08-01 VITALS
OXYGEN SATURATION: 100 % | SYSTOLIC BLOOD PRESSURE: 138 MMHG | DIASTOLIC BLOOD PRESSURE: 91 MMHG | HEART RATE: 74 BPM | RESPIRATION RATE: 20 BRPM | TEMPERATURE: 98 F

## 2024-08-01 DIAGNOSIS — S91.331A PUNCTURE WOUND OF RIGHT HEEL, INITIAL ENCOUNTER: Primary | ICD-10-CM

## 2024-08-01 PROCEDURE — 99213 OFFICE O/P EST LOW 20 MIN: CPT | Performed by: NURSE PRACTITIONER

## 2024-08-01 PROCEDURE — 90715 TDAP VACCINE 7 YRS/> IM: CPT | Performed by: NURSE PRACTITIONER

## 2024-08-01 PROCEDURE — 90471 IMMUNIZATION ADMIN: CPT | Performed by: NURSE PRACTITIONER

## 2024-08-01 NOTE — ED PROVIDER NOTES
Patient Seen in: [facility]      History     Chief Complaint   Patient presents with    Foot Injury     Stated Complaint: L Foot Pain    Subjective:   HPI  Patient is a 34-year-old female that presents to the immediate care center today with concern for puncture wound to the posterior aspect of her left heel.  She was walking in open heeled shoes last evening.  She stepped on a nail which flipped upwards, penetrating the posterior aspect of her skin (through sock, not shoe).  She is cleaned it with antiseptic solution at home.  She has mild pain isolated to the area of puncture.  She had no motor or sensory deficit, no gait disturbance.        Objective:   Past Medical History:    Visual impairment    glasses              History reviewed. No pertinent surgical history.             Social History     Socioeconomic History    Marital status: Single   Tobacco Use    Smoking status: Never    Smokeless tobacco: Current   Vaping Use    Vaping status: Never Used   Substance and Sexual Activity    Alcohol use: Yes     Comment: weekly    Drug use: Not Currently              Review of Systems   Constitutional:  Negative for chills and fever.   Skin:  Positive for wound.   Neurological:  Negative for weakness and numbness.       Positive for stated Chief Complaint: Foot Injury    Other systems are as noted in HPI.  Constitutional and vital signs reviewed.      All other systems reviewed and negative except as noted above.    Physical Exam     ED Triage Vitals [08/01/24 1428]   BP (!) 138/91   Pulse 74   Resp 20   Temp 98.2 °F (36.8 °C)   Temp src Temporal   SpO2 100 %   O2 Device None (Room air)       Current Vitals:   Vital Signs  BP: (!) 138/91  Pulse: 74  Resp: 20  Temp: 98.2 °F (36.8 °C)  Temp src: Temporal    Oxygen Therapy  SpO2: 100 %  O2 Device: None (Room air)            Physical Exam  Vitals and nursing note reviewed.   Constitutional:       General: She is not in acute distress.  Cardiovascular:       Pulses: Normal pulses.   Musculoskeletal:        Feet:    Skin:     General: Skin is warm and dry.   Neurological:      Mental Status: She is alert and oriented to person, place, and time.   Psychiatric:         Behavior: Behavior normal.               ED Course   Labs Reviewed - No data to display       Medications   Tetanus-Diphth-Acell Pertussis (Tdap) (Boostrix) injection 0.5 mL (0.5 mL Intramuscular Given 8/1/24 1446)                   MDM      Patient will clean this twice daily with soap and water, apply Neosporin, and keep dressing in place for the next several days.  She will monitor closely for signs of infection and seek follow-up reevaluation if symptoms change.  She states understanding and agrees with plan.                                 Medical Decision Making  Differential diagnoses considered today include, but are not exclusive of: Puncture wound; retained foreign body; fracture; cellulitis.    Problems Addressed:  Puncture wound of right heel, initial encounter: self-limited or minor problem    Risk  OTC drugs.        Disposition and Plan     Clinical Impression:  1. Puncture wound of right heel, initial encounter         Disposition:  Discharge  8/1/2024  2:50 pm    Follow-up:  Shant Barber MD  [address] [place] [id number]  [phone]      As needed          Medications Prescribed:  Discharge Medication List as of 8/1/2024  2:51 PM

## 2024-08-01 NOTE — ED INITIAL ASSESSMENT (HPI)
Pt presents with injury to left heel. Pt stepped on a nail, \"went through sock into foot\". Pt reports having to pull nail out. Occurred last evening at approx 930a. No report of retained FB.     Last Boostrix 1/25/16

## 2024-09-26 NOTE — PROCEDURE: COUNSELING
Disp Refills Start End    metFORMIN (GLUCOPHAGE) 500 MG tablet 60 tablet 1 8/14/2024 --    Sig - Route: Take 1 tablet by mouth in the morning and 1 tablet in the evening. Take with meals. - Oral        Metformin Antihyperglycemics Refill Protocol - 12 Month Protocol Inolnr0809/26/2024 02:18 PM   Protocol Details eGFR resulted within last 12 months -- IF CRITERIA FAILED REFER TO PROTOCOL DETAILS    Seen by prescribing provider or same department within the last 12 months or has a future appt in 3 months - IF FAILED PLEASE LOOK AT CHART REVIEW FOR LAST VISIT AND PROCEED ACCORDINGLY    Lipid Panel resulted within last 15 months    Hgb A1c less than 8 within last 6 months looking at last value -- IF CRITERIA FAILED REFER TO PROTOCOL DETAILS    Medication (including dose and sig) on current meds list    eGFR greater than 30 within last 12 months looking at last value OR If eGFR is between 30-44 then has patient had a repeat eGFR resulted in past 6 months -- IF CRITERIA FAILED REFER TO PROTOCOL DETAILS        Medication: metFORMIN (GLUCOPHAGE) 500 MG tablet  passed protocol.   Last office visit date: 5/6/2024  Next appointment scheduled?: No;  Pt is due for follow up May 2025.    Number of refills given: 12    Script sent.     
Detail Level: Zone

## 2024-11-18 ENCOUNTER — HOSPITAL ENCOUNTER (OUTPATIENT)
Age: 34
Discharge: HOME | End: 2024-11-18
Payer: COMMERCIAL

## 2024-11-18 ENCOUNTER — APPOINTMENT (OUTPATIENT)
Dept: GENERAL RADIOLOGY | Age: 34
End: 2024-11-18
Attending: NURSE PRACTITIONER
Payer: COMMERCIAL

## 2024-11-18 VITALS
HEART RATE: 79 BPM | OXYGEN SATURATION: 100 % | RESPIRATION RATE: 20 BRPM | SYSTOLIC BLOOD PRESSURE: 141 MMHG | DIASTOLIC BLOOD PRESSURE: 95 MMHG | TEMPERATURE: 98 F

## 2024-11-18 DIAGNOSIS — M79.629 PAIN OF UPPER ARM AFTER TRAUMA: Primary | ICD-10-CM

## 2024-11-18 PROCEDURE — 73060 X-RAY EXAM OF HUMERUS: CPT | Performed by: NURSE PRACTITIONER

## 2024-11-18 PROCEDURE — 99213 OFFICE O/P EST LOW 20 MIN: CPT | Performed by: NURSE PRACTITIONER
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
## 2024-12-04 ENCOUNTER — OFFICE VISIT (OUTPATIENT)
Dept: FAMILY MEDICINE CLINIC | Facility: CLINIC | Age: 34
End: 2024-12-04
Payer: COMMERCIAL

## 2024-12-04 ENCOUNTER — LAB ENCOUNTER (OUTPATIENT)
Dept: LAB | Age: 34
End: 2024-12-04
Attending: FAMILY MEDICINE
Payer: COMMERCIAL

## 2024-12-04 VITALS
RESPIRATION RATE: 17 BRPM | SYSTOLIC BLOOD PRESSURE: 133 MMHG | WEIGHT: 234 LBS | OXYGEN SATURATION: 99 % | HEIGHT: 63 IN | BODY MASS INDEX: 41.46 KG/M2 | DIASTOLIC BLOOD PRESSURE: 89 MMHG | HEART RATE: 71 BPM

## 2024-12-04 DIAGNOSIS — Z00.00 ANNUAL PHYSICAL EXAM: Primary | ICD-10-CM

## 2024-12-04 DIAGNOSIS — I10 ESSENTIAL HYPERTENSION: ICD-10-CM

## 2024-12-04 DIAGNOSIS — Z00.00 ANNUAL PHYSICAL EXAM: ICD-10-CM

## 2024-12-04 DIAGNOSIS — N89.8 VAGINAL IRRITATION: ICD-10-CM

## 2024-12-04 LAB
ALBUMIN SERPL-MCNC: 4.3 G/DL (ref 3.2–4.8)
ALBUMIN/GLOB SERPL: 1.5 {RATIO} (ref 1–2)
ALP LIVER SERPL-CCNC: 34 U/L
ALT SERPL-CCNC: 8 U/L
ANION GAP SERPL CALC-SCNC: 7 MMOL/L (ref 0–18)
AST SERPL-CCNC: 18 U/L (ref ?–34)
BILIRUB SERPL-MCNC: 0.4 MG/DL (ref 0.3–1.2)
BUN BLD-MCNC: 11 MG/DL (ref 9–23)
BUN/CREAT SERPL: 12.6 (ref 10–20)
CALCIUM BLD-MCNC: 9.3 MG/DL (ref 8.7–10.4)
CHLORIDE SERPL-SCNC: 104 MMOL/L (ref 98–112)
CHOLEST SERPL-MCNC: 101 MG/DL (ref ?–200)
CO2 SERPL-SCNC: 27 MMOL/L (ref 21–32)
CREAT BLD-MCNC: 0.87 MG/DL
DEPRECATED RDW RBC AUTO: 39.7 FL (ref 35.1–46.3)
EGFRCR SERPLBLD CKD-EPI 2021: 90 ML/MIN/1.73M2 (ref 60–?)
ERYTHROCYTE [DISTWIDTH] IN BLOOD BY AUTOMATED COUNT: 12.2 % (ref 11–15)
FASTING PATIENT LIPID ANSWER: YES
FASTING STATUS PATIENT QL REPORTED: YES
GLOBULIN PLAS-MCNC: 2.9 G/DL (ref 2–3.5)
GLUCOSE BLD-MCNC: 85 MG/DL (ref 70–99)
HCT VFR BLD AUTO: 36.5 %
HDLC SERPL-MCNC: 42 MG/DL (ref 40–59)
HGB BLD-MCNC: 12.5 G/DL
LDLC SERPL CALC-MCNC: 44 MG/DL (ref ?–100)
MCH RBC QN AUTO: 30.1 PG (ref 26–34)
MCHC RBC AUTO-ENTMCNC: 34.2 G/DL (ref 31–37)
MCV RBC AUTO: 88 FL
NONHDLC SERPL-MCNC: 59 MG/DL (ref ?–130)
OSMOLALITY SERPL CALC.SUM OF ELEC: 285 MOSM/KG (ref 275–295)
PLATELET # BLD AUTO: 305 10(3)UL (ref 150–450)
POTASSIUM SERPL-SCNC: 4.1 MMOL/L (ref 3.5–5.1)
PROT SERPL-MCNC: 7.2 G/DL (ref 5.7–8.2)
RBC # BLD AUTO: 4.15 X10(6)UL
SODIUM SERPL-SCNC: 138 MMOL/L (ref 136–145)
TRIGL SERPL-MCNC: 70 MG/DL (ref 30–149)
TSI SER-ACNC: 0.83 UIU/ML (ref 0.55–4.78)
VLDLC SERPL CALC-MCNC: 10 MG/DL (ref 0–30)
WBC # BLD AUTO: 6.1 X10(3) UL (ref 4–11)

## 2024-12-04 PROCEDURE — 3075F SYST BP GE 130 - 139MM HG: CPT | Performed by: FAMILY MEDICINE

## 2024-12-04 PROCEDURE — 3079F DIAST BP 80-89 MM HG: CPT | Performed by: FAMILY MEDICINE

## 2024-12-04 PROCEDURE — 84443 ASSAY THYROID STIM HORMONE: CPT

## 2024-12-04 PROCEDURE — 99395 PREV VISIT EST AGE 18-39: CPT | Performed by: FAMILY MEDICINE

## 2024-12-04 PROCEDURE — 80053 COMPREHEN METABOLIC PANEL: CPT

## 2024-12-04 PROCEDURE — 3008F BODY MASS INDEX DOCD: CPT | Performed by: FAMILY MEDICINE

## 2024-12-04 PROCEDURE — 80061 LIPID PANEL: CPT

## 2024-12-04 PROCEDURE — 36415 COLL VENOUS BLD VENIPUNCTURE: CPT

## 2024-12-04 PROCEDURE — 85027 COMPLETE CBC AUTOMATED: CPT

## 2024-12-04 RX ORDER — AMLODIPINE BESYLATE 5 MG/1
5 TABLET ORAL DAILY
Qty: 90 TABLET | Refills: 0 | Status: SHIPPED | OUTPATIENT
Start: 2024-12-04

## 2024-12-04 NOTE — H&P
HPI:    Wilma Reagan is a 34 year old female presents to clinic for annual physical exam.  Overall, feeling well.  Reports some vaginal discomfort, itching.  No discharge or odor.  Denies new partners.  Had 1 irregular menstrual cycle last month, only had 1 day of bleeding which was heavy.  On OCPs for contraception. Patient's last menstrual period was 11/21/2024 (exact date).  Normal appetite, admits to poor diet.  She does not exercise.  Normal bowel movements and urination.  No change in sleep habits.    HISTORY:  Past Medical History:    Visual impairment    glasses      No past surgical history on file.   Family History   Problem Relation Age of Onset    Alcohol and Other Disorders Associated Father         alcoholism    Diabetes Father     Hypertension Mother     Other (Other) Mother         sarcodosis    Stroke Maternal Grandmother     Diabetes Paternal Grandmother     Hypertension Paternal Grandmother       Social History:   Social History     Socioeconomic History    Marital status: Single   Tobacco Use    Smoking status: Never    Smokeless tobacco: Current   Vaping Use    Vaping status: Never Used   Substance and Sexual Activity    Alcohol use: Yes     Comment: weekly    Drug use: Not Currently        Medications (Active prior to today's visit):  Current Outpatient Medications   Medication Sig Dispense Refill    amLODIPine 5 MG Oral Tab Take 1 tablet (5 mg total) by mouth daily. 90 tablet 0    Norethin Ace-Eth Estrad-FE (JUNEL FE 1.5/30) 1.5-30 MG-MCG Oral Tab Take 1 tablet by mouth daily. 84 tablet 3       Allergies:  Allergies[1]      Depression Screening (PHQ-2/PHQ-9): Over the LAST 2 WEEKS   Little interest or pleasure in doing things: Not at all    Feeling down, depressed, or hopeless: Not at all    PHQ-2 SCORE: 0           ROS:   Review of Systems   All other systems reviewed and are negative.      PHYSICAL EXAM:     Vitals:    12/04/24 0914   BP: 133/89   BP Location: Left arm   Patient Position:  Sitting   Cuff Size: large   Pulse: 71   Resp: 17   SpO2: 99%   Weight: 234 lb (106.1 kg)   Height: 5' 3\" (1.6 m)     Physical Exam  Vitals reviewed.   Constitutional:       General: She is not in acute distress.  HENT:      Head: Normocephalic and atraumatic.      Right Ear: Tympanic membrane, ear canal and external ear normal.      Left Ear: Tympanic membrane, ear canal and external ear normal.      Nose: Nose normal.      Mouth/Throat:      Pharynx: Uvula midline.   Eyes:      Conjunctiva/sclera: Conjunctivae normal.      Pupils: Pupils are equal, round, and reactive to light.   Neck:      Thyroid: No thyromegaly.   Cardiovascular:      Rate and Rhythm: Normal rate and regular rhythm.      Heart sounds: Normal heart sounds. No murmur heard.  Pulmonary:      Effort: Pulmonary effort is normal. No respiratory distress.      Breath sounds: Normal breath sounds. No wheezing or rales.   Chest:   Breasts:     Right: Normal. No swelling, bleeding, inverted nipple, mass, nipple discharge, skin change or tenderness.      Left: Normal. No swelling, bleeding, inverted nipple, mass, nipple discharge, skin change or tenderness.   Abdominal:      General: Bowel sounds are normal. There is no distension.      Palpations: Abdomen is soft.      Tenderness: There is no abdominal tenderness. There is no guarding or rebound.   Genitourinary:     General: Normal vulva.      Vagina: Normal. No vaginal discharge.      Cervix: Normal.   Musculoskeletal:      Cervical back: Normal range of motion and neck supple.   Lymphadenopathy:      Cervical: No cervical adenopathy.   Neurological:      Mental Status: She is alert.         ASSESSMENT/PLAN:   (Z00.00) Annual physical exam  (primary encounter diagnosis)  Plan: Lipid Panel [E], Comp Metabolic Panel (14) [E],        TSH W Reflex To Free T4 [E], CBC, Platelet, No         Differential [E]  - Immunizations UTD. Declines flu vaccine  - Reinforced healthy diet, lifestyle, and exercise.  -  Past Medical/Social/Family histories reviewed  - Regular dental visits recommended   - Regular eye exams recommended     Health Maintenance   Topic Date Due    Pap Smear  02/02/2029       Follow up in 1 year or sooner if needed      (I10) Essential hypertension  Plan: amLODIPine 5 MG Oral Tab  -Blood pressure has been elevated at last few visits.  Occasional headaches.  She is on OCPs, does not want to stop taking them.  Amlodipine 5 mg to pharmacy.  Follow-up in 2 weeks or sooner if needed.    (N89.8) Vaginal irritation  Plan: Vaginitis Vaginosis PCR Panel, Chlamydia/Gc   -Remarkable pelvic exam.  Vaginitis, GC/chlamydia swabs to lab.  Will await results and treat as indicated.               Responsible party/patient verbalized understanding of information discussed. No barriers to learning observed.            Orders This Visit:  Orders Placed This Encounter   Procedures    Lipid Panel [E]    Comp Metabolic Panel (14) [E]    TSH W Reflex To Free T4 [E]    CBC, Platelet, No Differential [E]    Fluzone trivalent vaccine, PF 0.5mL, 6mo+ (38426)    Vaginitis Vaginosis PCR Panel    Chlamydia/Gc Amplification [E]       Meds This Visit:  Requested Prescriptions     Signed Prescriptions Disp Refills    amLODIPine 5 MG Oral Tab 90 tablet 0     Sig: Take 1 tablet (5 mg total) by mouth daily.       Imaging & Referrals:  INFLUENZA VACCINE, TRI, PRESERV FREE, 0.5 ML     Chaperone offered at visit today.     The 21st Century cures Act makes medical notes like these available to patients in the interest of transparency.  However, be advised that this is a medical document.  It is intended as peer to peer communication.  It is written in medical language and may contain abbreviations or verbiage that are unfamiliar.  It may appear blunt or direct.  Medical documents are intended to carry relevant information, facts as evident, and the clinical opinion of the practitioner.      This note was created by Dragon voice recognition.  Errors in content may be related to improper recognition by the system; efforts to review and correct have been done but errors may still exist. Please contact me with any questions.       12/4/2024  Shant Barber MD       [1] No Known Allergies

## 2024-12-05 LAB
BV BACTERIA DNA VAG QL NAA+PROBE: NEGATIVE
C GLABRATA DNA VAG QL NAA+PROBE: NEGATIVE
C KRUSEI DNA VAG QL NAA+PROBE: NEGATIVE
C TRACH DNA SPEC QL NAA+PROBE: NEGATIVE
CANDIDA DNA VAG QL NAA+PROBE: POSITIVE
N GONORRHOEA DNA SPEC QL NAA+PROBE: NEGATIVE
T VAGINALIS DNA VAG QL NAA+PROBE: NEGATIVE

## 2024-12-18 ENCOUNTER — OFFICE VISIT (OUTPATIENT)
Dept: FAMILY MEDICINE CLINIC | Facility: CLINIC | Age: 34
End: 2024-12-18
Payer: COMMERCIAL

## 2024-12-18 VITALS
DIASTOLIC BLOOD PRESSURE: 83 MMHG | OXYGEN SATURATION: 99 % | RESPIRATION RATE: 18 BRPM | HEART RATE: 71 BPM | SYSTOLIC BLOOD PRESSURE: 128 MMHG

## 2024-12-18 DIAGNOSIS — I10 ESSENTIAL HYPERTENSION: Primary | ICD-10-CM

## 2024-12-18 DIAGNOSIS — L21.9 SEBORRHEIC DERMATITIS: ICD-10-CM

## 2024-12-18 PROCEDURE — 3074F SYST BP LT 130 MM HG: CPT | Performed by: FAMILY MEDICINE

## 2024-12-18 PROCEDURE — 3079F DIAST BP 80-89 MM HG: CPT | Performed by: FAMILY MEDICINE

## 2024-12-18 PROCEDURE — G2211 COMPLEX E/M VISIT ADD ON: HCPCS | Performed by: FAMILY MEDICINE

## 2024-12-18 PROCEDURE — 99214 OFFICE O/P EST MOD 30 MIN: CPT | Performed by: FAMILY MEDICINE

## 2024-12-18 RX ORDER — KETOCONAZOLE 20 MG/ML
1 SHAMPOO, SUSPENSION TOPICAL
Qty: 120 ML | Refills: 0 | Status: SHIPPED | OUTPATIENT
Start: 2024-12-18

## 2024-12-18 RX ORDER — NORETHINDRONE ACETATE AND ETHINYL ESTRADIOL AND FERROUS FUMARATE 1.5-30(21)
1 KIT ORAL DAILY
Qty: 84 TABLET | Refills: 3 | OUTPATIENT
Start: 2024-12-18

## 2024-12-18 RX ORDER — AMLODIPINE BESYLATE 5 MG/1
5 TABLET ORAL DAILY
Qty: 90 TABLET | Refills: 3 | Status: SHIPPED | OUTPATIENT
Start: 2024-12-18

## 2024-12-18 RX ORDER — KETOCONAZOLE 20 MG/ML
1 SHAMPOO, SUSPENSION TOPICAL
Qty: 120 ML | Refills: 0 | Status: SHIPPED | OUTPATIENT
Start: 2024-12-19 | End: 2024-12-18

## 2024-12-18 NOTE — PROGRESS NOTES
HPI:    Wilma Reagan is a 34 year old female presents clinic for follow-up regarding hypertension.  At last visit patient started amlodipine.  She has been taking medication daily, denies side effects.  Trying to limit salt in diet and exercise regularly.  Has an appoint with bariatrics in a few months.  Additionally, patient has a lot of itching/flaking on her scalp.  She uses scalp oils and dandruff shampoos which have not been effective.  Unsure how to proceed.    HISTORY:  Past Medical History:    Visual impairment    glasses      No past surgical history on file.   Family History   Problem Relation Age of Onset    Alcohol and Other Disorders Associated Father         alcoholism    Diabetes Father     Hypertension Mother     Other (Other) Mother         sarcodosis    Stroke Maternal Grandmother     Diabetes Paternal Grandmother     Hypertension Paternal Grandmother       Social History:   Social History     Socioeconomic History    Marital status: Single   Tobacco Use    Smoking status: Never    Smokeless tobacco: Current   Vaping Use    Vaping status: Never Used   Substance and Sexual Activity    Alcohol use: Yes     Comment: weekly    Drug use: Not Currently        Medications (Active prior to today's visit):  Current Outpatient Medications   Medication Sig Dispense Refill    amLODIPine 5 MG Oral Tab Take 1 tablet (5 mg total) by mouth daily. 90 tablet 3    ketoconazole 2 % External Shampoo Apply 1 Application topically twice a week. 120 mL 0    Norethin Ace-Eth Estrad-FE (JUNEL FE 1.5/30) 1.5-30 MG-MCG Oral Tab Take 1 tablet by mouth daily. 84 tablet 3    fluconazole 150 MG Oral Tab To take one tablet and repeat dose in 3 days if no improvement. (Patient not taking: Reported on 12/18/2024) 2 tablet 0       Allergies:  Allergies[1]      Depression Screening (PHQ-2/PHQ-9): Over the LAST 2 WEEKS                         ROS:   Review of Systems   All other systems reviewed and are negative.      PHYSICAL EXAM:      Vitals:    12/18/24 1030   BP: 128/83   BP Location: Left arm   Patient Position: Sitting   Cuff Size: large   Pulse: 71   Resp: 18   SpO2: 99%     Physical Exam  Vitals reviewed.   Constitutional:       General: She is not in acute distress.  Cardiovascular:      Rate and Rhythm: Normal rate and regular rhythm.   Pulmonary:      Effort: Pulmonary effort is normal. No respiratory distress.      Breath sounds: Normal breath sounds.   Neurological:      Mental Status: She is alert.         ASSESSMENT/PLAN:   (I10) Essential hypertension  (primary encounter diagnosis)  Plan: amLODIPine 5 MG Oral Tab  -Blood pressure at goal, to continue current management.  Continued lifestyle modifications encouraged.  Follow-up in 6 months or sooner if needed.      (L21.9) Seborrheic dermatitis  Plan: Derm Referral - In Network  -Ketoconazole 2% shampoo to pharmacy, to use twice weekly.  To stop scalp oils.  Follow-up with dermatology if symptoms do not improve, referral placed.               Responsible party/patient verbalized understanding of information discussed. No barriers to learning observed.            Orders This Visit:  No orders of the defined types were placed in this encounter.      Meds This Visit:  Requested Prescriptions     Signed Prescriptions Disp Refills    amLODIPine 5 MG Oral Tab 90 tablet 3     Sig: Take 1 tablet (5 mg total) by mouth daily.    ketoconazole 2 % External Shampoo 120 mL 0     Sig: Apply 1 Application topically twice a week.       Imaging & Referrals:  DERM - INTERNAL     Chaperone offered at visit today.     The 21st Century cures Act makes medical notes like these available to patients in the interest of transparency.  However, be advised that this is a medical document.  It is intended as peer to peer communication.  It is written in medical language and may contain abbreviations or verbiage that are unfamiliar.  It may appear blunt or direct.  Medical documents are intended to carry  relevant information, facts as evident, and the clinical opinion of the practitioner.      This note was created by Dragon voice recognition. Errors in content may be related to improper recognition by the system; efforts to review and correct have been done but errors may still exist. Please contact me with any questions.       12/18/2024  Shant Barber MD       [1] No Known Allergies

## 2025-02-17 RX ORDER — NORETHINDRONE ACETATE AND ETHINYL ESTRADIOL 1.5-30(21)
1 KIT ORAL DAILY
Qty: 84 TABLET | Refills: 3 | OUTPATIENT
Start: 2025-02-17

## 2025-03-12 ENCOUNTER — DOCUMENTATION ONLY (OUTPATIENT)
Dept: SURGERY | Facility: CLINIC | Age: 35
End: 2025-03-12

## 2025-03-12 ENCOUNTER — LAB ENCOUNTER (OUTPATIENT)
Dept: LAB | Facility: HOSPITAL | Age: 35
End: 2025-03-12
Attending: INTERNAL MEDICINE
Payer: COMMERCIAL

## 2025-03-12 ENCOUNTER — OFFICE VISIT (OUTPATIENT)
Dept: SURGERY | Facility: CLINIC | Age: 35
End: 2025-03-12
Payer: COMMERCIAL

## 2025-03-12 VITALS
RESPIRATION RATE: 16 BRPM | WEIGHT: 237 LBS | BODY MASS INDEX: 43.61 KG/M2 | HEART RATE: 76 BPM | HEIGHT: 61.7 IN | OXYGEN SATURATION: 97 % | DIASTOLIC BLOOD PRESSURE: 82 MMHG | SYSTOLIC BLOOD PRESSURE: 124 MMHG

## 2025-03-12 DIAGNOSIS — E53.8 LOW SERUM VITAMIN B12: ICD-10-CM

## 2025-03-12 DIAGNOSIS — F43.9 STRESS: ICD-10-CM

## 2025-03-12 DIAGNOSIS — I10 HTN (HYPERTENSION), BENIGN: ICD-10-CM

## 2025-03-12 DIAGNOSIS — R73.09 ABNORMAL BLOOD SUGAR: ICD-10-CM

## 2025-03-12 DIAGNOSIS — E66.01 MORBID OBESITY WITH BMI OF 40.0-44.9, ADULT (HCC): ICD-10-CM

## 2025-03-12 DIAGNOSIS — I10 HTN (HYPERTENSION), BENIGN: Primary | ICD-10-CM

## 2025-03-12 LAB
ATRIAL RATE: 78 BPM
EST. AVERAGE GLUCOSE BLD GHB EST-MCNC: 103 MG/DL (ref 68–126)
HBA1C MFR BLD: 5.2 % (ref ?–5.7)
P AXIS: 34 DEGREES
P-R INTERVAL: 152 MS
Q-T INTERVAL: 392 MS
QRS DURATION: 82 MS
QTC CALCULATION (BEZET): 446 MS
R AXIS: 58 DEGREES
T AXIS: 32 DEGREES
VENTRICULAR RATE: 78 BPM
VIT B12 SERPL-MCNC: 308 PG/ML (ref 211–911)

## 2025-03-12 PROCEDURE — 3079F DIAST BP 80-89 MM HG: CPT | Performed by: INTERNAL MEDICINE

## 2025-03-12 PROCEDURE — 3008F BODY MASS INDEX DOCD: CPT | Performed by: INTERNAL MEDICINE

## 2025-03-12 PROCEDURE — 83036 HEMOGLOBIN GLYCOSYLATED A1C: CPT | Performed by: INTERNAL MEDICINE

## 2025-03-12 PROCEDURE — 99205 OFFICE O/P NEW HI 60 MIN: CPT | Performed by: INTERNAL MEDICINE

## 2025-03-12 PROCEDURE — 3074F SYST BP LT 130 MM HG: CPT | Performed by: INTERNAL MEDICINE

## 2025-03-12 PROCEDURE — 82607 VITAMIN B-12: CPT | Performed by: INTERNAL MEDICINE

## 2025-03-12 RX ORDER — ESCITALOPRAM OXALATE 5 MG/1
5 TABLET ORAL DAILY
Qty: 90 TABLET | Refills: 1 | Status: SHIPPED | OUTPATIENT
Start: 2025-03-12 | End: 2025-06-10

## 2025-03-12 RX ORDER — PHENTERMINE HYDROCHLORIDE 15 MG/1
15 CAPSULE ORAL EVERY MORNING
Qty: 30 CAPSULE | Refills: 5 | Status: SHIPPED | OUTPATIENT
Start: 2025-03-12

## 2025-03-12 NOTE — PROGRESS NOTES
Approved    Prior authorization approved  Payer: BRENNA Deleon Case ID: [id number]    [phone]    [phone]  Note from payer: Your PA request has been approved.  Additional information will be provided in the approval communication. (Message 1146)  Approval Details    Authorized from February 10, 2025 to September 8, 2025  Electronic appeal: Not supported  View History  Medication Being Authorized    Phentermine HCl 15 MG Oral Cap  Take 1 capsule (15 mg total) by mouth every morning.  Dispense: 30 capsule Refills: 5   Start: 3/12/2025   Class: Print Script Diagnoses: Morbid obesity with BMI of 40.0-44.9, adult (HCC)   This order has been released to its destination.  To be filled at: None

## 2025-03-12 NOTE — PROGRESS NOTES
[facility]ation Note       Patient:  Wilma Reagan  :      3/24/1990  MRN:      [id number]    Referring Provider: Dr. Barber       Chief Complaint:    Chief Complaint   Patient presents with    Consult       SUBJECTIVE     History of Present Illness:  Wilma Reagan has been referred to me for evaluation and treatment.       33 yo who lives with family  Mom does the cooking  Currently at heaviest weight  [place]    Patient has tried several diets in the past including exercises and is frustrated with increase of weight. Weight has been a struggle for the past several years and is now starting to develop into co-morbidities that are worrisome to the patient. Patient is interested in losing weight, so it can stay off long term.    Patient also understands that this is a life style change and wants to get on track.    Interested in non surgical weight loss      Past Medical History:   Past Medical History:    HTN (hypertension), benign    Morbid obesity with BMI of 40.0-44.9, adult (HCC)    Visual impairment    glasses       OBJECTIVE     Vitals: /82   Pulse 76   Resp 16   Ht 5' 1.7\" (1.567 m)   Wt 237 lb (107.5 kg)   LMP 2024 (Exact Date)   SpO2 97%   BMI 43.77 kg/m²      Patient Medications:    Current Outpatient Medications   Medication Sig Dispense Refill    Phentermine HCl 15 MG Oral Cap Take 1 capsule (15 mg total) by mouth every morning. 30 capsule 5    escitalopram 5 MG Oral Tab Take 1 tablet (5 mg total) by mouth daily. 90 tablet 1    amLODIPine 5 MG Oral Tab Take 1 tablet (5 mg total) by mouth daily. 90 tablet 3    ketoconazole 2 % External Shampoo Apply 1 Application topically twice a week. 120 mL 0    fluconazole 150 MG Oral Tab To take one tablet and repeat dose in 3 days if no improvement. (Patient not taking: Reported on 2024) 2 tablet 0    Norethin Ace-Eth Estrad-FE (JUNEL FE 1.5/30) 1.5-30 MG-MCG Oral Tab Take 1 tablet by mouth daily. 84  tablet 3       Allergies:  Patient has no known allergies.     Comorbidities:  hypertension    Social History:    Social History     Socioeconomic History    Marital status: Single     Spouse name: Not on file    Number of children: Not on file    Years of education: Not on file    Highest education level: Not on file   Occupational History    Not on file   Tobacco Use    Smoking status: Never    Smokeless tobacco: Current   Vaping Use    Vaping status: Never Used   Substance and Sexual Activity    Alcohol use: Yes     Comment: weekly    Drug use: Not Currently    Sexual activity: Not on file   Other Topics Concern    Not on file   Social History Narrative    Not on file     Social Drivers of Health     Food Insecurity: Not on file   Transportation Needs: Not on file   Stress: Not on file   Housing Stability: Not on file     Surgical History:  No past surgical history on file.    Family History:    Family History   Problem Relation Age of Onset    Alcohol and Other Disorders Associated Father         alcoholism    Diabetes Father     Hypertension Mother     Other (Other) Mother         sarcodosis    Stroke Maternal Grandmother     Diabetes Paternal Grandmother     Hypertension Paternal Grandmother            Typical Dietary Intake:  Breakfast AM Snack Lunch PM Snack Dinner   FF, soda, refresher  Left overs, FF, soda juice Pork, chicken, corn, string beans, mashed potatoes   Eats quickly  ETOH: 3x/week      Soda Drinker?: Yes  If yes, how much?:  regular    Number of restaurant or fast food meals/week:  7 meals/week    Nutritional Goals Reviewed and Discussed:     Limit carbohydrates to 100 gms per day, Eat 100-200 calories within 1 hour of waking up, and Eat 3-4 cups of fresh fruit or vegetables daily    Behavior Modifications Reviewed and Discussed:    Eat breakfast, Eat 3 meals per day, Plan meals in advance, Read nutrition labels, Drink 64oz of water per day, Maintain a daily food journal, No drinking 30 minutes  before or after meals, Utilize portion control strategies to reduce calorie intake, Identify triggers for eating and manage cues, and Eat slowly and take 20 to 30 minutes to complete each meal      ROS:  Constitutional: positive for fatigue  Respiratory: positive for dyspnea on exertion  Cardiovascular: negative  Gastrointestinal: negative  Musculoskeletal:positive for back pain  Neurological: positive for headaches  Behavioral/Psych: positive for stress  Endocrine: negative  All other systems were reviewed and are negative.    Physical Exam:  General appearance: alert, appears stated age, cooperative, and moderately obese  Head: Normocephalic, without obvious abnormality, atraumatic  Back: symmetric, no curvature. ROM normal. No CVA tenderness.  Lungs: clear to auscultation bilaterally  Heart: S1, S2 normal, no murmur, click, rub or gallop, regular rate and rhythm  Abdomen:  soft, obese, non tender  Extremities: extremities normal, atraumatic, no cyanosis or edema  Pulses: 2+ and symmetric  Skin:  acanthosis behind   Neurologic: Grossly normal    ASSESSMENT     HYPERTENSION:  The patient's blood pressure has been well controlled.  she has been checking it as instructed and has remained in relatively good control.      Encounter Diagnosis(ses):   1. HTN (hypertension), benign    2. Abnormal blood sugar    3. Stress    4. Morbid obesity with BMI of 40.0-44.9, adult (HCC)        PLAN     Patient is not interested in bariatric surgery. Patient desires to pursue traditional weight loss at this time.      HYPERTENSION: Blood pressure stable on the above medications. No interval change in antihypertensive medication.     Stress: increased    Goals for next month:  1. Keep a food log.  2. Drink 48-64 ounces of non-caloric beverages per day. No fruit juices or regular soda.  3. Increase activity-upper body exercises, walk 10 minutes per day.  4. Increase fruit and vegetable servings to 5-6 per day.      PHENTERMINE: Since  the patient would like to try phentermine, and is aware of the potential side effects (hypertension, palpitations, tachycardia, and anxiety), I will give Wilma Reagan a prescription today to be used in conjunction with the above diet and exercise program. The patient will check her heart rate and blood pressure on a regular basis. She will call me if her BP goes over 140/90 or if she has palpitations or racing heart rate. She understands that I will not call in the prescription for her; she has to have an appointment to have the medication refilled.   Will start at 15 mg  Needs EKG    Will start Lexapro 5 mg        Diagnoses and all orders for this visit:    HTN (hypertension), benign  -     Hemoglobin A1C; Future  -     EKG 12 Lead to be performed at [facility]; Future    Abnormal blood sugar  -     Hemoglobin A1C; Future  -     EKG 12 Lead to be performed at [facility]; Future    Stress  -     Hemoglobin A1C; Future  -     EKG 12 Lead to be performed at [facility]; Future  -     escitalopram 5 MG Oral Tab; Take 1 tablet (5 mg total) by mouth daily.    Morbid obesity with BMI of 40.0-44.9, adult (HCC)  -     Hemoglobin A1C; Future  -     Phentermine HCl 15 MG Oral Cap; Take 1 capsule (15 mg total) by mouth every morning.  -     EKG 12 Lead to be performed at [facility]; Future        Adrien Cullen MD

## 2025-06-07 DIAGNOSIS — F43.9 STRESS: ICD-10-CM

## 2025-06-09 RX ORDER — ESCITALOPRAM OXALATE 5 MG/1
5 TABLET ORAL DAILY
Qty: 90 TABLET | Refills: 1 | OUTPATIENT
Start: 2025-06-09

## (undated) DEVICE — DRESSING BIOPATCH 1X4 CNTR

## (undated) DEVICE — UNDYED BRAIDED (POLYGLACTIN 910), SYNTHETIC ABSORBABLE SUTURE: Brand: COATED VICRYL

## (undated) DEVICE — CASED DISP BIPOLAR CORD

## (undated) DEVICE — CONTAINER SPEC STR 4OZ GRY LID

## (undated) DEVICE — 3M™ BAIR HUGGER® UNDERBODY BLANKET, FULL ACCESS, 10 PER CASE 63500: Brand: BAIR HUGGER™

## (undated) DEVICE — INTENDED FOR TISSUE SEPARATION, AND OTHER PROCEDURES THAT REQUIRE A SHARP SURGICAL BLADE TO PUNCTURE OR CUT.: Brand: BARD-PARKER ® STAINLESS STEEL BLADES

## (undated) DEVICE — TRAY SKIN PREP PVP-1

## (undated) DEVICE — MAJOR GENERAL: Brand: MEDLINE INDUSTRIES, INC.

## (undated) DEVICE — ABDOMINAL BINDER: Brand: DEROYAL

## (undated) DEVICE — SUTURE PROLENE 4-0 FS-2

## (undated) DEVICE — DRAIN INCS 7MM 20CMX7MM SIL

## (undated) DEVICE — PEN: MARKING STD PT 100/CS: Brand: MEDICAL ACTION INDUSTRIES

## (undated) DEVICE — SUTURE ETH 0 CT 48IN MFL LOOP

## (undated) DEVICE — ADHESIVE MASTISOL 2/3CC VL

## (undated) DEVICE — 3M™ STERI-STRIP™ REINFORCED ADHESIVE SKIN CLOSURES, R1547, 1/2 IN X 4 IN (12 MM X 100 MM), 6 STRIPS/ENVELOPE: Brand: 3M™ STERI-STRIP™

## (undated) DEVICE — SOL  .9 1000ML BTL

## (undated) DEVICE — PROXIMATE SKIN STAPLERS (35 WIDE) CONTAINS 35 STAINLESS STEEL STAPLES (FIXED HEAD): Brand: PROXIMATE

## (undated) DEVICE — 3M™ STERI-DRAPE™ INSTRUMENT POUCH 1018: Brand: STERI-DRAPE™

## (undated) DEVICE — SUTURE PDS II 2-0 CT-2

## (undated) DEVICE — DRAIN RESERVOIR RELIAVAC 100CC

## (undated) DEVICE — SUTURE VICRYL 2-0 FS-1

## (undated) DEVICE — 3M™ TEGADERM™ TRANSPARENT FILM DRESSING, 1626W, 4 IN X 4-3/4 IN (10 CM X 12 CM), 50 EACH/CARTON, 4 CARTON/CASE: Brand: 3M™ TEGADERM™

## (undated) DEVICE — 9534HP TRANSPARENT DRSG W/FRAME: Brand: 3M™ TEGADERM™

## (undated) DEVICE — APPLICATOR COTTON TIP 6\" 2/PK

## (undated) DEVICE — ENCORE® LATEX MICRO SIZE 6.5, STERILE LATEX POWDER-FREE SURGICAL GLOVE: Brand: ENCORE

## (undated) DEVICE — VIOLET BRAIDED (POLYGLACTIN 910), SYNTHETIC ABSORBABLE SUTURE: Brand: COATED VICRYL

## (undated) DEVICE — SYSTEM PAIN PMP 4ML/HR ONQ

## (undated) DEVICE — SUCTION CANISTER, 3000CC,SAFELINER: Brand: DEROYAL

## (undated) DEVICE — DRAPE SHEET LG

## (undated) DEVICE — ACCUVAC SMOKE ATTACHMENT

## (undated) DEVICE — CG INFILTRATION TUBING: Brand: CG INFILTRATION TUBING

## (undated) DEVICE — SPONGE LAP 18X18 XRAY STRL

## (undated) DEVICE — SUTURE ETHILON 3-0 669H

## (undated) DEVICE — TOWEL OR BLU 16X26 STRL

## (undated) DEVICE — VEST SRG 5 XL CUP R/L

## (undated) DEVICE — PSI-TEC TUBING: Brand: PSI-TEC TUBING

## (undated) DEVICE — GOWN SURG AERO BLUE PERF LG

## (undated) DEVICE — BANDAGE ROLL,100% COTTON, 6 PLY, LARGE: Brand: KERLIX

## (undated) NOTE — LETTER
8/14/2019          To Whom It May Concern:    Gloria Mejia is currently incapacitated due to chronic medical condition. Please excuse her for 1 week beginning 8/8/19, she may return to work on 8/16/19.      If you require additional information please cont

## (undated) NOTE — LETTER
9/22/2020          To Whom It May Concern:    Warren Montenegro is currently under my medical care. She has a history of chronic back pain and is being evaluated for possible breast reduction to help improve symptoms.   If you require additional information pl

## (undated) NOTE — LETTER
Date & Time: 4/21/2022, 10:44 AM  Patient: Josue Tobin  Encounter Provider(s):    MAR Birmingham       To Whom It May Concern:    Gabbi Cooper was seen and treated in our department on 4/21/2022. She can return to work.     If you have any questions or concerns, please do not hesitate to call.        _____________________________  Physician/APC Signature

## (undated) NOTE — LETTER
2/2/2024          To Whom It May Concern:    Wilma Reagan is currently under my medical care and was seen today in my office.     If you require additional information please contact our office.        Sincerely,      Shant Barber MD          Document generated by:  Shant Barber MD

## (undated) NOTE — LETTER
4/12/2022              [address]         To Whom it may concern:     This is to certify that Taylor Briones had an appointment on 4/12/2022 at 12:11 PM with Michael Lopez MD.        Sincerely,            MD Marilee Tom [place] , [address] [place] [address] [address] [facility]  [phone]        Document electronically generated by:  Ezio Downing MA

## (undated) NOTE — LETTER
8/8/2019          To Whom It May Concern:    Adriana Gallardo is currently under my medical care and may not return to work at this time. Please excuse Jenny Montoya for 2 days. She may return to work on 8/12/19. Activity is restricted as follows: none.     If you

## (undated) NOTE — LETTER
5/4/2022              [address] [place] [address], [id number] [place] [address] Ln         To Whom It May Concern,      Radha Rodgers 3/24/1990 was seen in our clinic on 5/4/22.    Please contact our office with any questions    Sincerely,    MD Marilee García [place] , [address] [facility]  [address] [facility] [address]ina Alonso [facility] [address] [facility] [id number] [phone]        Document electronically generated by:  Charmayne Dean

## (undated) NOTE — LETTER
Date & Time: 8/1/2024, 2:50 PM  Patient: Wilma Reagan  Encounter Provider(s):    Caden Ghosh APRN       To Whom It May Concern:    Wilma Reagan was seen and treated in our department on 8/1/2024. She should not return to work until 8/5/24 .    If you have any questions or concerns, please do not hesitate to call.        _____________________________  Physician/APC Signature

## (undated) NOTE — MR AVS SNAPSHOT
After Visit Summary   2/2/2024    Wilma Reagan   MRN: [id number]           Visit Information     Date & Time  2/2/2024  8:30 AM Provider  Shant Barber MD [facility] Dept. Phone  [phone]      Your Vitals Were  Most recent update: 2/2/2024  8:35 AM    BP   125/87 (BP Location: Right arm, Patient Position: Sitting, Cuff Size: large)          Pulse   72          Resp   18          Ht   63\"          Wt   227 lb             LMP   01/30/2024 (Exact Date)    SpO2   98%    BMI   40.21 kg/m²         Allergies as of 2/2/2024  Review status set to Review Complete on 2/2/2024   No Known Allergies     Your Current Medications        Dosage    Norethin Ace-Eth Estrad-FE (JUNEL FE 1.5/30) 1.5-30 MG-MCG Oral Tab Take 1 tablet by mouth daily.      Diagnoses for This Visit    Annual physical exam   [380694]  -  Primary  Pap smear for cervical cancer screening   [340713]    ASCUS with positive high risk HPV cervical   [[id number]]             We Ordered the Following     Normal Orders This Visit    Chlamydia/Gc Amplification [E] [[id number] CUSTOM]     ThinPrep PAP with HPV Reflex Request [E] [[id number] CUSTOM]     ThinPrep PAP with HPV Reflex Request [[id number] CUSTOM]     Future Labs/Procedures Expected by Expires    Chlamydia/Gc Amplification [E] [[id number] CUSTOM]  2/2/2024 (Approximate) 2/2/2025    Comp Metabolic Panel (14) [E] [[id number] CUSTOM]  2/2/2024 (Approximate) 2/2/2025    Lipid Panel [E] [[id number] CUSTOM]  2/2/2024 (Approximate) 2/2/2025    ThinPrep PAP with HPV Reflex Request [E] [[id number] CUSTOM]  2/2/2024 2/2/2025    TSH W Reflex To Free T4 [E] [[id number] CUSTOM]  2/2/2024 (Approximate) 2/2/2025                Did you know that [facility] primary care physicians now offer Video Visits through [employer] for adult patients for a variety of conditions such as allergies, back pain and cold symptoms? Skip the drive and waiting room and online chat with a doctor face-to-face using  your web-cam enabled computer or mobile device wherever you are. Video Visits cost $50 and can be paid hassle-free using a credit, debit, or health savings card.  Not active on [employer]? Ask us how to get signed up today!          If you receive a survey from [employer], please take a few minutes to complete it and provide feedback. We strive to deliver the best patient experience and are looking for ways to make improvements. Your feedback will help us do so. For more information on [employer], please visit [email or web address]           No text in SmartText           No text in SmartText

## (undated) NOTE — MR AVS SNAPSHOT
[facility]  [address], [address] [facility]  [phone]               Thank you for choosing us for your health care visit with Matilde Rae MD.  We are glad to serve you and happy to provide you with this summary o Your blood pressure indicates you may be at-risk for Hypertension. Please consider the following Lifestyle Modifications. Also, please return for a follow-up Blood Pressure Check in 1 month.      Lifestyle Modification Recommendations:    Modification R Start activities slowly and build up over time Do what you like   Get your heart pumping – brisk walking, biking, swimming Even 10 minute increments are effective and add up over the week   2 ½ hours per week – spread out over time Use a samia to keep you